# Patient Record
Sex: FEMALE | Race: WHITE | NOT HISPANIC OR LATINO | Employment: FULL TIME | URBAN - METROPOLITAN AREA
[De-identification: names, ages, dates, MRNs, and addresses within clinical notes are randomized per-mention and may not be internally consistent; named-entity substitution may affect disease eponyms.]

---

## 2023-04-25 LAB
THYROTROPIN (MIU/L) IN SER/PLAS BY DETECTION LIMIT <= 0.05 MIU/L: 5.84 MIU/L (ref 0.44–3.98)
THYROXINE (T4) FREE (NG/DL) IN SER/PLAS: 1.03 NG/DL (ref 0.61–1.12)

## 2023-04-30 DIAGNOSIS — M13.0 POLYARTHRITIS, UNSPECIFIED: ICD-10-CM

## 2023-05-01 RX ORDER — MELOXICAM 15 MG/1
TABLET ORAL
Qty: 90 TABLET | Refills: 3 | Status: SHIPPED | OUTPATIENT
Start: 2023-05-01 | End: 2024-03-25

## 2023-07-08 PROBLEM — F41.9 ANXIETY AND DEPRESSION: Status: ACTIVE | Noted: 2023-07-08

## 2023-07-08 PROBLEM — F32.A ANXIETY AND DEPRESSION: Status: ACTIVE | Noted: 2023-07-08

## 2023-07-08 PROBLEM — Z00.00 ROUTINE ADULT HEALTH MAINTENANCE: Status: ACTIVE | Noted: 2023-07-08

## 2023-07-08 PROBLEM — Z87.19 H/O GASTRITIS: Status: ACTIVE | Noted: 2023-07-08

## 2023-07-08 PROBLEM — E03.9 HYPOTHYROIDISM: Status: ACTIVE | Noted: 2023-07-08

## 2023-07-08 PROBLEM — K59.00 CONSTIPATION: Status: ACTIVE | Noted: 2023-07-08

## 2023-07-08 PROBLEM — G47.9 DIFFICULTY SLEEPING: Status: ACTIVE | Noted: 2023-07-08

## 2023-07-08 PROBLEM — G47.33 OBSTRUCTIVE SLEEP APNEA (ADULT) (PEDIATRIC): Status: ACTIVE | Noted: 2023-07-08

## 2023-07-08 PROBLEM — M08.00 JUVENILE RHEUMATOID ARTHRITIS (MULTI): Status: ACTIVE | Noted: 2023-07-08

## 2023-07-08 PROBLEM — S22.000A COMPRESSION FRACTURE OF THORACIC VERTEBRA (MULTI): Status: ACTIVE | Noted: 2023-07-08

## 2023-07-08 PROBLEM — D25.9 UTERINE LEIOMYOMA: Status: ACTIVE | Noted: 2023-07-08

## 2023-07-08 PROBLEM — Z86.0100 HISTORY OF COLON POLYPS: Status: ACTIVE | Noted: 2023-07-08

## 2023-07-08 PROBLEM — R53.83 FATIGUE: Status: ACTIVE | Noted: 2023-07-08

## 2023-07-08 PROBLEM — L65.9 ALOPECIA: Status: ACTIVE | Noted: 2023-07-08

## 2023-07-08 PROBLEM — F32.1 MAJOR DEPRESSIVE DISORDER, SINGLE EPISODE, MODERATE (MULTI): Status: ACTIVE | Noted: 2023-07-08

## 2023-07-08 PROBLEM — R93.1 ABNORMAL SCREENING CARDIAC CT: Status: ACTIVE | Noted: 2023-07-08

## 2023-07-08 PROBLEM — Z87.81 H/O FRACTURE OF WRIST: Status: ACTIVE | Noted: 2023-07-08

## 2023-07-08 PROBLEM — K44.9 HIATAL HERNIA: Status: ACTIVE | Noted: 2023-07-08

## 2023-07-08 PROBLEM — R33.9 RETENTION OF URINE: Status: ACTIVE | Noted: 2023-07-08

## 2023-07-08 PROBLEM — R31.21 ASYMPTOMATIC MICROSCOPIC HEMATURIA: Status: ACTIVE | Noted: 2023-07-08

## 2023-07-08 PROBLEM — D64.9 ANEMIA: Status: ACTIVE | Noted: 2023-07-08

## 2023-07-08 PROBLEM — J45.909 REACTIVE AIRWAY DISEASE (HHS-HCC): Status: ACTIVE | Noted: 2023-07-08

## 2023-07-08 PROBLEM — R91.1 LUNG NODULE: Status: ACTIVE | Noted: 2023-07-08

## 2023-07-08 PROBLEM — M85.9 DISORDER OF BONE DENSITY AND STRUCTURE, UNSPECIFIED: Status: ACTIVE | Noted: 2023-07-08

## 2023-07-08 PROBLEM — N83.00 CYST, OVARY, FOLLICULAR: Status: ACTIVE | Noted: 2023-07-08

## 2023-07-08 PROBLEM — K21.9 GASTROESOPHAGEAL REFLUX DISEASE: Status: ACTIVE | Noted: 2023-07-08

## 2023-07-08 PROBLEM — K76.0 STEATOSIS OF LIVER: Status: ACTIVE | Noted: 2023-07-08

## 2023-07-08 PROBLEM — Z96.41 PRESENCE OF INSULIN PUMP: Status: ACTIVE | Noted: 2023-07-08

## 2023-07-08 PROBLEM — M13.0 POLYARTHROPATHY: Status: ACTIVE | Noted: 2023-07-08

## 2023-07-08 PROBLEM — K76.89 LIVER CYST: Status: ACTIVE | Noted: 2023-07-08

## 2023-07-08 PROBLEM — J30.9 ALLERGIC RHINITIS: Status: ACTIVE | Noted: 2023-07-08

## 2023-07-08 PROBLEM — R03.0 BLOOD PRESSURE ELEVATED WITHOUT HISTORY OF HTN: Status: ACTIVE | Noted: 2023-07-08

## 2023-07-08 PROBLEM — Z86.010 HISTORY OF COLON POLYPS: Status: ACTIVE | Noted: 2023-07-08

## 2023-07-31 LAB — THYROTROPIN (MIU/L) IN SER/PLAS BY DETECTION LIMIT <= 0.05 MIU/L: 4.53 MIU/L (ref 0.44–3.98)

## 2023-08-07 ENCOUNTER — LAB (OUTPATIENT)
Dept: LAB | Facility: LAB | Age: 58
End: 2023-08-07
Payer: COMMERCIAL

## 2023-08-07 ENCOUNTER — OFFICE VISIT (OUTPATIENT)
Dept: PRIMARY CARE | Facility: CLINIC | Age: 58
End: 2023-08-07
Payer: COMMERCIAL

## 2023-08-07 VITALS
HEART RATE: 64 BPM | DIASTOLIC BLOOD PRESSURE: 72 MMHG | HEIGHT: 70 IN | SYSTOLIC BLOOD PRESSURE: 134 MMHG | WEIGHT: 206.13 LBS | TEMPERATURE: 98.2 F | OXYGEN SATURATION: 95 % | BODY MASS INDEX: 29.51 KG/M2

## 2023-08-07 DIAGNOSIS — E10.9 TYPE 1 DIABETES MELLITUS WITHOUT COMPLICATION (MULTI): ICD-10-CM

## 2023-08-07 DIAGNOSIS — E55.9 VITAMIN D DEFICIENCY: ICD-10-CM

## 2023-08-07 DIAGNOSIS — Z12.31 ENCOUNTER FOR SCREENING MAMMOGRAM FOR BREAST CANCER: ICD-10-CM

## 2023-08-07 DIAGNOSIS — G47.33 OBSTRUCTIVE SLEEP APNEA (ADULT) (PEDIATRIC): ICD-10-CM

## 2023-08-07 DIAGNOSIS — R26.89 BALANCE PROBLEM: ICD-10-CM

## 2023-08-07 DIAGNOSIS — E83.52 HYPERCALCEMIA: ICD-10-CM

## 2023-08-07 DIAGNOSIS — Z00.00 ROUTINE ADULT HEALTH MAINTENANCE: Primary | ICD-10-CM

## 2023-08-07 DIAGNOSIS — R25.1 TREMOR: ICD-10-CM

## 2023-08-07 DIAGNOSIS — F32.A ANXIETY AND DEPRESSION: ICD-10-CM

## 2023-08-07 DIAGNOSIS — F41.9 ANXIETY AND DEPRESSION: ICD-10-CM

## 2023-08-07 DIAGNOSIS — Z13.820 SCREENING FOR OSTEOPOROSIS: ICD-10-CM

## 2023-08-07 DIAGNOSIS — Z00.00 ROUTINE ADULT HEALTH MAINTENANCE: ICD-10-CM

## 2023-08-07 PROBLEM — D64.9 ANEMIA: Status: RESOLVED | Noted: 2023-07-08 | Resolved: 2023-08-07

## 2023-08-07 PROBLEM — Z87.81 H/O COMPRESSION FRACTURE OF SPINE: Status: ACTIVE | Noted: 2023-07-08

## 2023-08-07 PROBLEM — G47.9 DIFFICULTY SLEEPING: Status: RESOLVED | Noted: 2023-07-08 | Resolved: 2023-08-07

## 2023-08-07 PROBLEM — K59.00 CONSTIPATION: Status: RESOLVED | Noted: 2023-07-08 | Resolved: 2023-08-07

## 2023-08-07 PROBLEM — R33.9 RETENTION OF URINE: Status: RESOLVED | Noted: 2023-07-08 | Resolved: 2023-08-07

## 2023-08-07 PROBLEM — F32.1 MAJOR DEPRESSIVE DISORDER, SINGLE EPISODE, MODERATE (MULTI): Status: RESOLVED | Noted: 2023-07-08 | Resolved: 2023-08-07

## 2023-08-07 PROBLEM — G25.81 RLS (RESTLESS LEGS SYNDROME): Status: ACTIVE | Noted: 2023-08-07

## 2023-08-07 PROBLEM — R53.83 FATIGUE: Status: RESOLVED | Noted: 2023-07-08 | Resolved: 2023-08-07

## 2023-08-07 LAB
BASOPHILS (10*3/UL) IN BLOOD BY AUTOMATED COUNT: 0.06 X10E9/L (ref 0–0.1)
BASOPHILS/100 LEUKOCYTES IN BLOOD BY AUTOMATED COUNT: 0.7 % (ref 0–2)
EOSINOPHILS (10*3/UL) IN BLOOD BY AUTOMATED COUNT: 0.25 X10E9/L (ref 0–0.7)
EOSINOPHILS/100 LEUKOCYTES IN BLOOD BY AUTOMATED COUNT: 2.8 % (ref 0–6)
ERYTHROCYTE DISTRIBUTION WIDTH (RATIO) BY AUTOMATED COUNT: 13.9 % (ref 11.5–14.5)
ERYTHROCYTE MEAN CORPUSCULAR HEMOGLOBIN CONCENTRATION (G/DL) BY AUTOMATED: 32.2 G/DL (ref 32–36)
ERYTHROCYTE MEAN CORPUSCULAR VOLUME (FL) BY AUTOMATED COUNT: 93 FL (ref 80–100)
ERYTHROCYTES (10*6/UL) IN BLOOD BY AUTOMATED COUNT: 4.89 X10E12/L (ref 4–5.2)
HEMATOCRIT (%) IN BLOOD BY AUTOMATED COUNT: 45.6 % (ref 36–46)
HEMOGLOBIN (G/DL) IN BLOOD: 14.7 G/DL (ref 12–16)
IMMATURE GRANULOCYTES/100 LEUKOCYTES IN BLOOD BY AUTOMATED COUNT: 0.3 % (ref 0–0.9)
LEUKOCYTES (10*3/UL) IN BLOOD BY AUTOMATED COUNT: 8.8 X10E9/L (ref 4.4–11.3)
LYMPHOCYTES (10*3/UL) IN BLOOD BY AUTOMATED COUNT: 2.48 X10E9/L (ref 1.2–4.8)
LYMPHOCYTES/100 LEUKOCYTES IN BLOOD BY AUTOMATED COUNT: 28.2 % (ref 13–44)
MONOCYTES (10*3/UL) IN BLOOD BY AUTOMATED COUNT: 0.62 X10E9/L (ref 0.1–1)
MONOCYTES/100 LEUKOCYTES IN BLOOD BY AUTOMATED COUNT: 7.1 % (ref 2–10)
NEUTROPHILS (10*3/UL) IN BLOOD BY AUTOMATED COUNT: 5.34 X10E9/L (ref 1.2–7.7)
NEUTROPHILS/100 LEUKOCYTES IN BLOOD BY AUTOMATED COUNT: 60.9 % (ref 40–80)
PLATELETS (10*3/UL) IN BLOOD AUTOMATED COUNT: 327 X10E9/L (ref 150–450)

## 2023-08-07 PROCEDURE — 80053 COMPREHEN METABOLIC PANEL: CPT

## 2023-08-07 PROCEDURE — 99213 OFFICE O/P EST LOW 20 MIN: CPT | Performed by: INTERNAL MEDICINE

## 2023-08-07 PROCEDURE — 3078F DIAST BP <80 MM HG: CPT | Performed by: INTERNAL MEDICINE

## 2023-08-07 PROCEDURE — 83970 ASSAY OF PARATHORMONE: CPT

## 2023-08-07 PROCEDURE — 82043 UR ALBUMIN QUANTITATIVE: CPT

## 2023-08-07 PROCEDURE — 82390 ASSAY OF CERULOPLASMIN: CPT

## 2023-08-07 PROCEDURE — 82306 VITAMIN D 25 HYDROXY: CPT

## 2023-08-07 PROCEDURE — 83735 ASSAY OF MAGNESIUM: CPT

## 2023-08-07 PROCEDURE — 86140 C-REACTIVE PROTEIN: CPT

## 2023-08-07 PROCEDURE — 99396 PREV VISIT EST AGE 40-64: CPT | Performed by: INTERNAL MEDICINE

## 2023-08-07 PROCEDURE — 1036F TOBACCO NON-USER: CPT | Performed by: INTERNAL MEDICINE

## 2023-08-07 PROCEDURE — 82570 ASSAY OF URINE CREATININE: CPT

## 2023-08-07 PROCEDURE — 3075F SYST BP GE 130 - 139MM HG: CPT | Performed by: INTERNAL MEDICINE

## 2023-08-07 PROCEDURE — 85025 COMPLETE CBC W/AUTO DIFF WBC: CPT

## 2023-08-07 PROCEDURE — 36415 COLL VENOUS BLD VENIPUNCTURE: CPT

## 2023-08-07 PROCEDURE — 86038 ANTINUCLEAR ANTIBODIES: CPT

## 2023-08-07 PROCEDURE — 3051F HG A1C>EQUAL 7.0%<8.0%: CPT | Performed by: INTERNAL MEDICINE

## 2023-08-07 PROCEDURE — 81003 URINALYSIS AUTO W/O SCOPE: CPT

## 2023-08-07 RX ORDER — FERROUS SULFATE 325(65) MG
65 TABLET ORAL
COMMUNITY
Start: 2022-08-01

## 2023-08-07 RX ORDER — SPIRONOLACTONE 50 MG/1
50 TABLET, FILM COATED ORAL DAILY
COMMUNITY
Start: 2017-07-31 | End: 2024-01-21

## 2023-08-07 RX ORDER — CYCLOSPORINE 0.5 MG/ML
1 EMULSION OPHTHALMIC EVERY 12 HOURS
COMMUNITY
Start: 2022-11-21

## 2023-08-07 RX ORDER — GABAPENTIN 300 MG/1
600 CAPSULE ORAL NIGHTLY
COMMUNITY
Start: 2021-09-16 | End: 2023-12-04 | Stop reason: SDUPTHER

## 2023-08-07 RX ORDER — CALCIUM CARBONATE 200(500)MG
1 TABLET,CHEWABLE ORAL AS NEEDED
COMMUNITY
Start: 2021-09-16

## 2023-08-07 RX ORDER — BLOOD SUGAR DIAGNOSTIC
STRIP MISCELLANEOUS
COMMUNITY
Start: 2008-05-13

## 2023-08-07 RX ORDER — FLUOXETINE HYDROCHLORIDE 40 MG/1
1 CAPSULE ORAL DAILY
COMMUNITY
Start: 2019-12-27 | End: 2023-11-25

## 2023-08-07 RX ORDER — LORATADINE 10 MG/1
1 CAPSULE, LIQUID FILLED ORAL DAILY
COMMUNITY

## 2023-08-07 RX ORDER — CHLORPHENIRAMINE MALEATE 4 MG
2 TABLET ORAL 2 TIMES DAILY
COMMUNITY
Start: 2021-09-16

## 2023-08-07 RX ORDER — INSULIN ASPART 100 [IU]/ML
INJECTION, SOLUTION INTRAVENOUS; SUBCUTANEOUS
COMMUNITY
Start: 2019-08-22 | End: 2023-10-18 | Stop reason: SDUPTHER

## 2023-08-07 RX ORDER — ESOMEPRAZOLE MAGNESIUM 40 MG/1
1 CAPSULE, DELAYED RELEASE ORAL DAILY
COMMUNITY
Start: 2021-09-16 | End: 2023-08-17

## 2023-08-07 RX ORDER — MOXIFLOXACIN 5 MG/ML
1 SOLUTION/ DROPS OPHTHALMIC AS NEEDED
COMMUNITY
Start: 2022-06-07

## 2023-08-07 RX ORDER — CHOLECALCIFEROL (VITAMIN D3) 50 MCG
1 TABLET ORAL DAILY
COMMUNITY
Start: 2021-09-17

## 2023-08-07 RX ORDER — LEVOTHYROXINE SODIUM 137 UG/1
1 TABLET ORAL DAILY
COMMUNITY
Start: 2016-01-19 | End: 2023-11-16

## 2023-08-07 ASSESSMENT — PATIENT HEALTH QUESTIONNAIRE - PHQ9
8. MOVING OR SPEAKING SO SLOWLY THAT OTHER PEOPLE COULD HAVE NOTICED. OR THE OPPOSITE, BEING SO FIGETY OR RESTLESS THAT YOU HAVE BEEN MOVING AROUND A LOT MORE THAN USUAL: NOT AT ALL
SUM OF ALL RESPONSES TO PHQ9 QUESTIONS 1 AND 2: 6
1. LITTLE INTEREST OR PLEASURE IN DOING THINGS: NEARLY EVERY DAY
4. FEELING TIRED OR HAVING LITTLE ENERGY: NEARLY EVERY DAY
9. THOUGHTS THAT YOU WOULD BE BETTER OFF DEAD, OR OF HURTING YOURSELF: NOT AT ALL
6. FEELING BAD ABOUT YOURSELF - OR THAT YOU ARE A FAILURE OR HAVE LET YOURSELF OR YOUR FAMILY DOWN: NEARLY EVERY DAY
7. TROUBLE CONCENTRATING ON THINGS, SUCH AS READING THE NEWSPAPER OR WATCHING TELEVISION: NOT AT ALL
5. POOR APPETITE OR OVEREATING: NEARLY EVERY DAY
3. TROUBLE FALLING OR STAYING ASLEEP OR SLEEPING TOO MUCH: MORE THAN HALF THE DAYS
SUM OF ALL RESPONSES TO PHQ QUESTIONS 1-9: 17
2. FEELING DOWN, DEPRESSED OR HOPELESS: NEARLY EVERY DAY

## 2023-08-07 NOTE — ASSESSMENT & PLAN NOTE
Annual Wellness exam completed   Preventive Health history reviewed:  Vaccines today Shingrix discussed  Labs ordered    Mammogram ordered  BMD ordered  Depression Screening done

## 2023-08-07 NOTE — PROGRESS NOTES
Reason for Visit: Annual Physical Exam    Assessment and Plan:  Problem List Items Addressed This Visit          High    Routine adult health maintenance - Primary    Overview     Influenza Vaccine, Split-Non Spec11/2/2011, 9/26/2009, 8/18/21, 10/1/22  Moderna COVID 19 vaccine 2/10/21, 3/10/21, 8/18/21  Pneumovax 7/9/2007  Adacel 7/17  PAP/HPV 4/18  ([facility]); PAP 4/5/21 (-)- sees GYN  Cscope 8/17 (7yrs); 11/2021 (12y)  Mamm 7/20/18, 11/5/19; 4/9/21, 8/9/22  BMD 12/19, 12/1/21         Current Assessment & Plan     Annual Wellness exam completed   Preventive Health history reviewed:  Vaccines today Shingrix discussed  Labs ordered    Mammogram ordered  BMD ordered  Depression Screening done         Relevant Orders    Urinalysis with Reflex Microscopic    Comprehensive Metabolic Panel    CBC and Auto Differential    XR DEXA bone density       Medium    Anxiety and depression    Overview      Failed Lexapro and Wellbutrin.   Zoloft caused side effects but helped the most.  on Prozac (20mg ineffective)         Hypercalcemia    Overview     1/23: 10.4           Current Assessment & Plan     Will repeat and check PTH         Relevant Orders    PTH, intact    Heavy Metals Screen, Urine    Obstructive sleep apnea (adult) (pediatric)    Overview      2/22 PSG: Moderate PRRENA  CPAP recommend, unable to tolerate         Type 1 diabetes mellitus without complication (CMS/[facility])    Overview     Managed by ENDO at [facility]  On insulin pump  7/22 Hba1c 7.1%;         Relevant Orders    Albumin, urine, random    Vitamin D deficiency    Overview     Comment on above: Goal 40-50Off supplement;         Relevant Orders    Vitamin D, Total     Other Visit Diagnoses       Encounter for screening mammogram for breast cancer        Relevant Orders    BI mammo bilateral screening tomosynthesis    Screening for osteoporosis        Relevant Orders    XR DEXA bone density    Tremor        ? etiology   will check labs  Get Neuro involved    Relevant  "Orders    Referral to Neurology    Magnesium    MR brain wo IV contrast    Ceruloplasmin    Heavy Metals Screen, Urine    MARIE with Reflex to LENKA    C-reactive protein    Balance problem        Unclear etiology  Doubt a med she has been on long term as cause  Will image brain  Neuro consult    Relevant Orders    Referral to Neurology    Magnesium    MR brain wo IV contrast    Ceruloplasmin    MARIE with Reflex to LENKA    C-reactive protein          HPI:  Falling 4 times in 2 months   Not passing out  Losing balance  Has to hold on to treadmill or falls off  Tremor developed in your left hand  Worse past few months  NO ne meds  No Has  + nausea, new   Is on MOBIC  Is on both claritin and zyrtec daily, ineffective alone, for 4 years  On gabapentin for RLS, for a year  leans more to left than right  No seizure, no syncope, no vertigo    Recent TSH 4.53  T4 1.0  Hba1c 7%  Sees Endo     ROS otherwise negative aside from what was mentioned above in HPI.    Vitals  /72   Pulse 64   Temp 36.8 °C (98.2 °F)   Ht 1.778 m (5' 10\")   Wt 93.5 kg (206 lb 2 oz)   SpO2 95%   BMI 29.58 kg/m²   Body mass index is 29.58 kg/m².  Physical Exam  Gen: Alert, NAD  HEENT:  Normal exam, PERRLA, EMOI  Neck:  No masses/nodes palpable; Thyroid nontender and without nodules; No DAYNA  Respiratory:  Lungs CTAB  CV: RRR  Neuro:  Gross motor and sensory intact to all 4 modalities though temperature sensation is mildly decreased in LE compared to UE. She does exhibit a tremor in her left index finger while raising it  No asterixes, no retsing tremor  Skin:  No suspicious lesions present  Breast: No masses or axillary lymphadenopathy  Seeing GYN soon    Active Problem List  Patient Active Problem List   Diagnosis    Abnormal screening cardiac CT    Routine adult health maintenance    H/O gastritis    History of colon polyps    H/O fracture of wrist    BMI 29.0-29.9,adult    Allergic rhinitis    Alopecia    Anxiety and depression    Asymptomatic " microscopic hematuria    Blood pressure elevated without history of HTN    Gastroesophageal reflux disease    Hiatal hernia    H/O compression fracture of spine    Cyst, ovary, follicular    Disorder of bone density and structure, unspecified    Steatosis of liver    Liver cyst    Hypothyroidism    Juvenile rheumatoid arthritis (CMS/HCC)    Lung nodule    Mixed hyperlipidemia    Obstructive sleep apnea (adult) (pediatric)    Polyarthropathy    Presence of insulin pump    Reactive airway disease    Type 1 diabetes mellitus without complication (CMS/HCC)    Uterine leiomyoma    Vitamin D deficiency    RLS (restless legs syndrome)    Hypercalcemia       Comprehensive Medical/Surgical/Social/Family History  Past Medical History:   Diagnosis Date    Abnormal screening cardiac CT     8/22:1. Coronary artery calcium score of 13* (LAD) 2. PARK 83rd percentile** for age, gender, and race in asymptomatic patients. *Coronary Artery  Agatston Score risk:Very low 1-99 Small hiatal hernia. 1.2 cm hepatic cysts and several too small to characterize liver hypodensities. A 1.7 cm round hypodensity at the dome of the liver laterally does not meet the criteria of a simple cyst.    H/O abdominal ultrasound     8/22: US abd: Cyst in the superior right hepatic lobe measures 1.8 cm. There is a possible background of hepatic steatosis. No other focal hepatic abnormality was demonstrated    H/O bone density study 12/06/2021 12/21:Lowest T score -1.4. FRAX* 10-year Probability of Fracture Based on femoral neck BMD: DualFemur (Left) Major Osteoporotic Fracture: 11.8% Hip Fracture:                1.1% 12/19: -1.7:  FRACTURE RISK (based on FRAX):                       10-year absolute fracture risk:                            - major osteoporotic fracture = 5.8 %                            - hip fracture = 0.5 %    H/O chest x-ray     3/17:  normal    H/O CT scan of chest     1/20: IMPRESSION: 1. 6 mm ground-glass nodule within the apical  segment of the left lower lobe, stable from , most likely benign given its stability. No additional pulmonary nodules or masses identified. 2. Mild scattered areas of atelectasis in both lungs as above. Lungs are otherwise clear of focal infiltrate. 3. Trace pericardial effusion. 4. Age indeterminate mild compression deformityT8.    H/O echocardiogram     : normal    H/O pelvic ultrasound     : IMPRESSION: Endometrium measures up to 11 mm in thickness, as described above. This is abnormal in the postmenopausal patient with uterine bleeding.  Leiomyomatous uterus, as discussed above.  Simple left ovarian cyst measures up to 2.7 cm; surveillance pelvic ultrasound is recommended in 1 year for this finding.     Past Surgical History:   Procedure Laterality Date    BELT ABDOMINOPLASTY  07/10/2018    abdominplasty and breast augmentation.     SECTION, CLASSIC  07/10/2018     Section    COLONOSCOPY  2021: polyp (5 years) 2021: - The examined portion of the ileum was normal.                        - Non-bleeding internal hemorrhoids. 10 y repeat    ESOPHAGOGASTRODUODENOSCOPY  2022:  - Medium-sized hiatal hernia. GE junction is at 36cm                        - Gastritis. Biopsied.                        - Normal examined duodenum. : Medium-sized hiatal hernia. GE junction is at 36cm                        - Gastritis. Biopsied.                        - Normal examined duodenum.    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               F: Prostate CA    B: Type 2 DM    B: Depression    PGM: Lung Cancer                                MGF:Skin  Cancer                           Allergies and Medications  Desvenlafaxine, Pollen extracts, Sertraline, and Spironolactone  Current Outpatient Medications   Medication Instructions    calcium carbonate (Tums) 200 mg calcium chewable tablet 1 tablet, oral, As needed    cetirizine (ZyrTEC) 10 mg capsule 1 capsule, oral, Daily    chlorpheniramine (Chlor-Trimeton) 4 mg tablet 2 tablets, oral, 2 times daily    cholecalciferol (Vitamin D-3) 50 MCG (2000 UT) tablet 1 tablet, oral, Daily    esomeprazole (NexIUM) 40 mg DR capsule 1 capsule, oral, Daily    ferrous sulfate 325 (65 Fe) MG tablet 65 mg of iron, oral, Daily with breakfast    FLUoxetine (PROzac) 40 mg capsule 1 capsule, oral, Daily    gabapentin (NEURONTIN) 600 mg, oral, Nightly    glucagon 1 mg injection Glucagon Emergency 1 MG Injection Kit  Refills: 0    L. acidophilus/Bifid. animalis 32 billion cell capsule 1 capsule, oral, Daily    levothyroxine (Synthroid, Levoxyl) 137 mcg tablet 1 tablet, oral, Daily, Take 1 tablet daily for 6 days then 1 1/2 tablets on Sunday.    loratadine (Claritin Liqui-Gel) 10 mg capsule 1 capsule, oral, Daily    meloxicam (Mobic) 15 mg tablet TAKE 1 TABLET EVERY DAY AS NEEDED    moxifloxacin (Vigamox) 0.5 % ophthalmic solution 1 drop, Both Eyes, As needed    NovoLOG U-100 Insulin aspart 100 unit/mL injection 70 units daily via insulin pump    OneTouch Ultra Test strip check 4 times a day. 250.01. Insulin pump.    Restasis 0.05 % ophthalmic emulsion 1 drop, Both Eyes, Every 12 hours    spironolactone (ALDACTONE) 50 mg, oral, Daily

## 2023-08-08 LAB
ALANINE AMINOTRANSFERASE (SGPT) (U/L) IN SER/PLAS: 23 U/L (ref 7–45)
ALBUMIN (G/DL) IN SER/PLAS: 4.5 G/DL (ref 3.4–5)
ALBUMIN (MG/L) IN URINE: <7 MG/L
ALBUMIN/CREATININE (UG/MG) IN URINE: NORMAL UG/MG CRT (ref 0–30)
ALKALINE PHOSPHATASE (U/L) IN SER/PLAS: 93 U/L (ref 33–110)
ANION GAP IN SER/PLAS: 12 MMOL/L (ref 10–20)
ANTI-NUCLEAR ANTIBODY (ANA): NEGATIVE
APPEARANCE, URINE: CLEAR
ASPARTATE AMINOTRANSFERASE (SGOT) (U/L) IN SER/PLAS: 27 U/L (ref 9–39)
BILIRUBIN TOTAL (MG/DL) IN SER/PLAS: 0.6 MG/DL (ref 0–1.2)
BILIRUBIN, URINE: NEGATIVE
BLOOD, URINE: NEGATIVE
C REACTIVE PROTEIN (MG/L) IN SER/PLAS: 0.57 MG/DL
CALCIDIOL (25 OH VITAMIN D3) (NG/ML) IN SER/PLAS: 47 NG/ML
CALCIUM (MG/DL) IN SER/PLAS: 10.1 MG/DL (ref 8.6–10.3)
CARBON DIOXIDE, TOTAL (MMOL/L) IN SER/PLAS: 29 MMOL/L (ref 21–32)
CERULOPLASMIN (MG/DL) IN SER/PLAS: 36 MG/DL (ref 20–60)
CHLORIDE (MMOL/L) IN SER/PLAS: 100 MMOL/L (ref 98–107)
COLOR, URINE: YELLOW
CREATININE (MG/DL) IN SER/PLAS: 0.89 MG/DL (ref 0.5–1.05)
CREATININE (MG/DL) IN URINE: 88.5 MG/DL (ref 20–320)
GFR FEMALE: 76 ML/MIN/1.73M2
GLUCOSE (MG/DL) IN SER/PLAS: 166 MG/DL (ref 74–99)
GLUCOSE, URINE: NEGATIVE MG/DL
KETONES, URINE: NEGATIVE MG/DL
LEUKOCYTE ESTERASE, URINE: NEGATIVE
MAGNESIUM (MG/DL) IN SER/PLAS: 1.98 MG/DL (ref 1.6–2.4)
NITRITE, URINE: NEGATIVE
PARATHYRIN INTACT (PG/ML) IN SER/PLAS: 32.7 PG/ML (ref 18.5–88)
PH, URINE: 6 (ref 5–8)
POTASSIUM (MMOL/L) IN SER/PLAS: 4.4 MMOL/L (ref 3.5–5.3)
PROTEIN TOTAL: 7.3 G/DL (ref 6.4–8.2)
PROTEIN, URINE: NEGATIVE MG/DL
SODIUM (MMOL/L) IN SER/PLAS: 137 MMOL/L (ref 136–145)
SPECIFIC GRAVITY, URINE: 1.02 (ref 1–1.03)
UREA NITROGEN (MG/DL) IN SER/PLAS: 15 MG/DL (ref 6–23)
UROBILINOGEN, URINE: <2 MG/DL (ref 0–1.9)

## 2023-08-09 ENCOUNTER — LAB (OUTPATIENT)
Dept: LAB | Facility: LAB | Age: 58
End: 2023-08-09
Payer: COMMERCIAL

## 2023-08-09 DIAGNOSIS — R25.1 TREMOR: ICD-10-CM

## 2023-08-09 DIAGNOSIS — E83.52 HYPERCALCEMIA: ICD-10-CM

## 2023-08-09 PROCEDURE — 83825 ASSAY OF MERCURY: CPT

## 2023-08-14 LAB
ARSENIC URINE - PER 24H: NORMAL UG/D (ref 0–49.9)
ARSENIC URINE - PER VOLUME: <10 UG/L (ref 0–34.9)
ARSENIC, URINE - RATIO TO CRT: NORMAL UG/G CRT (ref 0–29.9)
CREATININE 24 HOUR URINE: NORMAL MG/D (ref 500–1400)
CREATININE, URINE - PER VOLUME: 123 MG/DL
HOURS COLLECTED (ARUP): NORMAL
LEAD 24 HOUR URINE: NORMAL UG/D (ref 0–8.1)
LEAD, URINE PER VOLUME: <5 UG/L (ref 0–5)
LEAD/CREATININE RATIO U: NORMAL UG/G CRT (ref 0–5)
MERCURY, URINE 24 HR: NORMAL UG/D (ref 0–20)
MERCURY, URINE PER VOLUME: <2.5 UG/L (ref 0–5)
MERCURY/CREATININE RATIO: NORMAL UG/G CRT (ref 0–20)

## 2023-08-17 DIAGNOSIS — K21.9 GASTRO-ESOPHAGEAL REFLUX DISEASE WITHOUT ESOPHAGITIS: ICD-10-CM

## 2023-08-17 RX ORDER — ESOMEPRAZOLE MAGNESIUM 40 MG/1
40 CAPSULE, DELAYED RELEASE ORAL DAILY
Qty: 90 CAPSULE | Refills: 3 | Status: SHIPPED | OUTPATIENT
Start: 2023-08-17

## 2023-09-28 ENCOUNTER — APPOINTMENT (OUTPATIENT)
Dept: PRIMARY CARE | Facility: CLINIC | Age: 58
End: 2023-09-28
Payer: COMMERCIAL

## 2023-10-04 PROBLEM — M54.50 LOW BACK PAIN: Status: ACTIVE | Noted: 2023-10-04

## 2023-10-04 PROBLEM — E03.9 HYPOTHYROIDISM, ADULT: Status: ACTIVE | Noted: 2023-10-04

## 2023-10-04 PROBLEM — R25.1 TREMOR: Status: ACTIVE | Noted: 2023-10-04

## 2023-10-04 PROBLEM — R06.83 SNORING: Status: ACTIVE | Noted: 2023-10-04

## 2023-10-04 PROBLEM — M19.90 ARTHRITIS: Status: ACTIVE | Noted: 2023-07-08

## 2023-10-04 PROBLEM — R05.3 CHRONIC COUGH: Status: ACTIVE | Noted: 2023-10-04

## 2023-10-04 PROBLEM — Z86.16 HISTORY OF SEVERE ACUTE RESPIRATORY SYNDROME CORONAVIRUS 2 (SARS-COV-2) DISEASE: Status: ACTIVE | Noted: 2023-10-04

## 2023-10-04 PROBLEM — S90.30XA CONTUSION OF FOOT: Status: ACTIVE | Noted: 2023-10-04

## 2023-10-04 PROBLEM — R26.89 IMPAIRMENT OF BALANCE: Status: ACTIVE | Noted: 2023-10-04

## 2023-10-04 PROBLEM — M89.9 DISORDER OF BONE: Status: ACTIVE | Noted: 2023-07-08

## 2023-10-04 PROBLEM — U09.9 COVID-19 LONG HAULER: Status: ACTIVE | Noted: 2023-10-04

## 2023-10-04 PROBLEM — K76.89 HEPATIC CYST: Status: ACTIVE | Noted: 2023-10-04

## 2023-10-04 PROBLEM — M72.2 PLANTAR FASCIITIS: Status: ACTIVE | Noted: 2023-10-04

## 2023-10-04 PROBLEM — R53.83 FATIGUE: Status: ACTIVE | Noted: 2023-10-04

## 2023-10-04 PROBLEM — M25.561 CHRONIC PAIN OF BOTH KNEES: Status: ACTIVE | Noted: 2023-10-04

## 2023-10-04 PROBLEM — R40.0 DAYTIME SOMNOLENCE: Status: ACTIVE | Noted: 2023-10-04

## 2023-10-04 PROBLEM — Z97.8 USES SELF-APPLIED CONTINUOUS GLUCOSE MONITORING DEVICE: Status: ACTIVE | Noted: 2023-10-04

## 2023-10-04 PROBLEM — M25.562 CHRONIC PAIN OF BOTH KNEES: Status: ACTIVE | Noted: 2023-10-04

## 2023-10-04 PROBLEM — N95.0 POSTMENOPAUSAL BLEEDING: Status: ACTIVE | Noted: 2023-10-04

## 2023-10-04 PROBLEM — N94.9 FEMALE GENITAL SYMPTOMS: Status: ACTIVE | Noted: 2023-10-04

## 2023-10-04 PROBLEM — M77.40 METATARSALGIA: Status: ACTIVE | Noted: 2023-10-04

## 2023-10-04 PROBLEM — K76.0 FATTY LIVER: Status: ACTIVE | Noted: 2023-10-04

## 2023-10-04 PROBLEM — M79.673 PAIN OF FOOT: Status: ACTIVE | Noted: 2023-10-04

## 2023-10-04 PROBLEM — R09.02 HYPOXIA: Status: ACTIVE | Noted: 2023-10-04

## 2023-10-04 PROBLEM — S22.000A COMPRESSION FRACTURE OF THORACIC VERTEBRA (MULTI): Status: ACTIVE | Noted: 2023-10-04

## 2023-10-04 PROBLEM — M13.0 POLYARTHRITIS: Status: ACTIVE | Noted: 2023-10-04

## 2023-10-04 PROBLEM — K21.9 CHRONIC GERD: Status: ACTIVE | Noted: 2023-10-04

## 2023-10-04 PROBLEM — G25.81 RESTLESS LEG SYNDROME: Status: ACTIVE | Noted: 2023-08-07

## 2023-10-04 PROBLEM — L64.9 ANDROGENIC ALOPECIA: Status: ACTIVE | Noted: 2023-10-04

## 2023-10-04 PROBLEM — E66.3 OVERWEIGHT: Status: ACTIVE | Noted: 2023-07-08

## 2023-10-04 PROBLEM — D75.1 ERYTHROCYTOSIS: Status: ACTIVE | Noted: 2023-10-04

## 2023-10-04 PROBLEM — S99.929A INJURY OF GREAT TOE: Status: ACTIVE | Noted: 2023-10-04

## 2023-10-04 PROBLEM — R69 DISEASE SUSPECTED: Status: ACTIVE | Noted: 2023-10-04

## 2023-10-04 PROBLEM — S49.90XA INJURY OF UPPER EXTREMITY: Status: ACTIVE | Noted: 2023-10-04

## 2023-10-04 PROBLEM — S62.109A FRACTURE OF WRIST: Status: ACTIVE | Noted: 2023-10-04

## 2023-10-04 PROBLEM — R92.1 CALCIFICATION OF BREAST: Status: ACTIVE | Noted: 2023-10-04

## 2023-10-04 PROBLEM — E10.65 UNCONTROLLED TYPE 1 DIABETES MELLITUS WITH HYPERGLYCEMIA (MULTI): Status: ACTIVE | Noted: 2023-10-04

## 2023-10-04 PROBLEM — R92.8 ABNORMAL MAMMOGRAM: Status: ACTIVE | Noted: 2023-10-04

## 2023-10-04 PROBLEM — K76.9 LIVER DISEASE: Status: ACTIVE | Noted: 2022-08-15

## 2023-10-04 PROBLEM — G89.29 CHRONIC PAIN OF BOTH KNEES: Status: ACTIVE | Noted: 2023-10-04

## 2023-10-04 PROBLEM — Z96.41 INSULIN PUMP STATUS: Status: ACTIVE | Noted: 2023-10-04

## 2023-10-04 PROBLEM — D25.9 UTERINE FIBROID: Status: ACTIVE | Noted: 2023-10-04

## 2023-10-04 RX ORDER — ATORVASTATIN CALCIUM 20 MG/1
TABLET, FILM COATED ORAL
COMMUNITY
Start: 2019-12-27 | End: 2024-02-22 | Stop reason: ALTCHOICE

## 2023-10-04 RX ORDER — ROSUVASTATIN CALCIUM 10 MG/1
TABLET, COATED ORAL
COMMUNITY
Start: 2020-02-24 | End: 2024-03-07 | Stop reason: SINTOL

## 2023-10-04 RX ORDER — FLUOXETINE HYDROCHLORIDE 20 MG/1
CAPSULE ORAL
COMMUNITY
Start: 2019-12-27 | End: 2024-03-07 | Stop reason: WASHOUT

## 2023-10-04 RX ORDER — BUPROPION HYDROCHLORIDE 150 MG/1
TABLET ORAL
COMMUNITY
Start: 2019-08-22 | End: 2024-02-22 | Stop reason: ALTCHOICE

## 2023-10-04 RX ORDER — CELECOXIB 200 MG/1
CAPSULE ORAL
COMMUNITY
Start: 2020-02-24 | End: 2023-12-04 | Stop reason: WASHOUT

## 2023-10-04 RX ORDER — INSULIN GLARGINE 300 U/ML
INJECTION, SOLUTION SUBCUTANEOUS
COMMUNITY
Start: 2017-07-31

## 2023-10-04 RX ORDER — FLUTICASONE PROPIONATE 50 MCG
SPRAY, SUSPENSION (ML) NASAL
COMMUNITY

## 2023-10-04 RX ORDER — CIPROFLOXACIN HYDROCHLORIDE 3 MG/ML
SOLUTION/ DROPS OPHTHALMIC
COMMUNITY
Start: 2021-09-07

## 2023-10-04 RX ORDER — IBUPROFEN 800 MG/1
TABLET ORAL
COMMUNITY
Start: 2021-09-16 | End: 2023-12-04 | Stop reason: WASHOUT

## 2023-10-04 RX ORDER — ALBUTEROL SULFATE 90 UG/1
AEROSOL, METERED RESPIRATORY (INHALATION)
COMMUNITY
Start: 2017-03-14

## 2023-10-18 DIAGNOSIS — E10.9 TYPE 1 DIABETES MELLITUS WITHOUT COMPLICATION (MULTI): ICD-10-CM

## 2023-10-18 RX ORDER — INSULIN ASPART 100 [IU]/ML
INJECTION, SOLUTION INTRAVENOUS; SUBCUTANEOUS
Qty: 60 ML | Refills: 0 | Status: SHIPPED | OUTPATIENT
Start: 2023-10-18 | End: 2024-03-25

## 2023-11-16 DIAGNOSIS — E03.9 HYPOTHYROIDISM, UNSPECIFIED: ICD-10-CM

## 2023-11-16 RX ORDER — LEVOTHYROXINE SODIUM 137 UG/1
137 TABLET ORAL DAILY
Qty: 90 TABLET | Refills: 3 | Status: SHIPPED | OUTPATIENT
Start: 2023-11-16

## 2023-11-25 DIAGNOSIS — F41.9 ANXIETY DISORDER, UNSPECIFIED: ICD-10-CM

## 2023-11-25 DIAGNOSIS — F32.A DEPRESSION, UNSPECIFIED: ICD-10-CM

## 2023-11-25 RX ORDER — FLUOXETINE HYDROCHLORIDE 40 MG/1
40 CAPSULE ORAL DAILY
Qty: 90 CAPSULE | Refills: 3 | Status: SHIPPED | OUTPATIENT
Start: 2023-11-25

## 2023-12-04 ENCOUNTER — ANCILLARY PROCEDURE (OUTPATIENT)
Dept: RADIOLOGY | Facility: CLINIC | Age: 58
End: 2023-12-04
Payer: COMMERCIAL

## 2023-12-04 ENCOUNTER — OFFICE VISIT (OUTPATIENT)
Dept: PRIMARY CARE | Facility: CLINIC | Age: 58
End: 2023-12-04
Payer: COMMERCIAL

## 2023-12-04 VITALS
DIASTOLIC BLOOD PRESSURE: 86 MMHG | SYSTOLIC BLOOD PRESSURE: 123 MMHG | OXYGEN SATURATION: 91 % | BODY MASS INDEX: 29.16 KG/M2 | TEMPERATURE: 96.9 F | HEART RATE: 61 BPM | WEIGHT: 203.2 LBS

## 2023-12-04 DIAGNOSIS — M54.50 CHRONIC BILATERAL LOW BACK PAIN WITHOUT SCIATICA: ICD-10-CM

## 2023-12-04 DIAGNOSIS — R93.7 ABNORMAL X-RAY OF LUMBAR SPINE: Primary | ICD-10-CM

## 2023-12-04 DIAGNOSIS — G89.29 CHRONIC BILATERAL LOW BACK PAIN WITHOUT SCIATICA: ICD-10-CM

## 2023-12-04 DIAGNOSIS — G89.29 CHRONIC BILATERAL LOW BACK PAIN WITHOUT SCIATICA: Primary | ICD-10-CM

## 2023-12-04 DIAGNOSIS — R19.00 PELVIC MASS: ICD-10-CM

## 2023-12-04 DIAGNOSIS — M54.50 CHRONIC BILATERAL LOW BACK PAIN WITHOUT SCIATICA: Primary | ICD-10-CM

## 2023-12-04 DIAGNOSIS — R30.0 DYSURIA: ICD-10-CM

## 2023-12-04 LAB
APPEARANCE UR: CLEAR
BILIRUB UR STRIP.AUTO-MCNC: NEGATIVE MG/DL
COLOR UR: YELLOW
GLUCOSE UR STRIP.AUTO-MCNC: NEGATIVE MG/DL
HOLD SPECIMEN: NORMAL
KETONES UR STRIP.AUTO-MCNC: NEGATIVE MG/DL
LEUKOCYTE ESTERASE UR QL STRIP.AUTO: NEGATIVE
NITRITE UR QL STRIP.AUTO: NEGATIVE
PH UR STRIP.AUTO: 7 [PH]
PROT UR STRIP.AUTO-MCNC: NEGATIVE MG/DL
RBC # UR STRIP.AUTO: NEGATIVE /UL
SP GR UR STRIP.AUTO: 1.02
UROBILINOGEN UR STRIP.AUTO-MCNC: <2 MG/DL

## 2023-12-04 PROCEDURE — 72110 X-RAY EXAM L-2 SPINE 4/>VWS: CPT

## 2023-12-04 PROCEDURE — 1036F TOBACCO NON-USER: CPT | Performed by: NURSE PRACTITIONER

## 2023-12-04 PROCEDURE — 99214 OFFICE O/P EST MOD 30 MIN: CPT | Performed by: NURSE PRACTITIONER

## 2023-12-04 PROCEDURE — 96372 THER/PROPH/DIAG INJ SC/IM: CPT | Performed by: NURSE PRACTITIONER

## 2023-12-04 PROCEDURE — 3079F DIAST BP 80-89 MM HG: CPT | Performed by: NURSE PRACTITIONER

## 2023-12-04 PROCEDURE — 3074F SYST BP LT 130 MM HG: CPT | Performed by: NURSE PRACTITIONER

## 2023-12-04 PROCEDURE — 3051F HG A1C>EQUAL 7.0%<8.0%: CPT | Performed by: NURSE PRACTITIONER

## 2023-12-04 PROCEDURE — 72110 X-RAY EXAM L-2 SPINE 4/>VWS: CPT | Performed by: RADIOLOGY

## 2023-12-04 PROCEDURE — 81003 URINALYSIS AUTO W/O SCOPE: CPT

## 2023-12-04 RX ORDER — KETOROLAC TROMETHAMINE 30 MG/ML
60 INJECTION, SOLUTION INTRAMUSCULAR; INTRAVENOUS ONCE
Status: COMPLETED | OUTPATIENT
Start: 2023-12-04 | End: 2023-12-04

## 2023-12-04 RX ORDER — METHYLPREDNISOLONE 4 MG/1
TABLET ORAL
Qty: 21 TABLET | Refills: 0 | Status: SHIPPED | OUTPATIENT
Start: 2023-12-04 | End: 2023-12-11

## 2023-12-04 RX ORDER — GABAPENTIN 300 MG/1
CAPSULE ORAL
Qty: 360 CAPSULE | Refills: 3 | Status: SHIPPED | OUTPATIENT
Start: 2023-12-04

## 2023-12-04 RX ADMIN — KETOROLAC TROMETHAMINE 60 MG: 30 INJECTION, SOLUTION INTRAMUSCULAR; INTRAVENOUS at 08:53

## 2023-12-04 ASSESSMENT — PATIENT HEALTH QUESTIONNAIRE - PHQ9
5. POOR APPETITE OR OVEREATING: MORE THAN HALF THE DAYS
SUM OF ALL RESPONSES TO PHQ QUESTIONS 1-9: 25
1. LITTLE INTEREST OR PLEASURE IN DOING THINGS: NEARLY EVERY DAY
SUM OF ALL RESPONSES TO PHQ9 QUESTIONS 1 AND 2: 6
8. MOVING OR SPEAKING SO SLOWLY THAT OTHER PEOPLE COULD HAVE NOTICED. OR THE OPPOSITE, BEING SO FIGETY OR RESTLESS THAT YOU HAVE BEEN MOVING AROUND A LOT MORE THAN USUAL: MORE THAN HALF THE DAYS
6. FEELING BAD ABOUT YOURSELF - OR THAT YOU ARE A FAILURE OR HAVE LET YOURSELF OR YOUR FAMILY DOWN: NEARLY EVERY DAY
10. IF YOU CHECKED OFF ANY PROBLEMS, HOW DIFFICULT HAVE THESE PROBLEMS MADE IT FOR YOU TO DO YOUR WORK, TAKE CARE OF THINGS AT HOME, OR GET ALONG WITH OTHER PEOPLE: EXTREMELY DIFFICULT
7. TROUBLE CONCENTRATING ON THINGS, SUCH AS READING THE NEWSPAPER OR WATCHING TELEVISION: NEARLY EVERY DAY
4. FEELING TIRED OR HAVING LITTLE ENERGY: NEARLY EVERY DAY
9. THOUGHTS THAT YOU WOULD BE BETTER OFF DEAD, OR OF HURTING YOURSELF: NEARLY EVERY DAY
3. TROUBLE FALLING OR STAYING ASLEEP OR SLEEPING TOO MUCH: NEARLY EVERY DAY
2. FEELING DOWN, DEPRESSED OR HOPELESS: NEARLY EVERY DAY

## 2023-12-04 NOTE — PROGRESS NOTES
Problem List Items Addressed This Visit       Low back pain - Primary    Overview     Chiro (Ann Hernandez)  Flexeril  Lido patch  Mobic  gabapentin         Current Assessment & Plan     Toradol 60 mg IM today  - did get relief last time she had this at urgent care  Medrol dose pack to hold on to prn  Increase aimee from 600 mg HS to 300 mg morning, 300 mg afternoon, 600 mg HS  XR  PT  Med spine           Relevant Medications    gabapentin (Neurontin) 300 mg capsule    methylPREDNISolone (Medrol Dospak) 4 mg tablets    ketorolac (Toradol) injection 60 mg (Completed)    Other Relevant Orders    Urinalysis with Reflex Microscopic and Culture    XR lumbar spine complete 4+ views    Referral to Physical Therapy    Referral to Medical Spine     Other Visit Diagnoses       Dysuria        r/o UTI    Relevant Orders    Urinalysis with Reflex Microscopic and Culture             Subjective   Patient ID: Lina Sharma is a 58 y.o. female who presents for Back Pain (Back pain not responding to treatment/[facility] 2 months ago got a shot helped but came back /Seeing chiro/Pain on both sides shooting pains and pain in lower abdomen and umbillicus/Has a hard time sitting on the toilet in the morning and has hard time getting off the toilet/Pain with urination no frequency or urgency not cloudy or any odor).    HPI  Lumbar pain  Constant  Cramping  Worst 10/10  Now 8/10  Sciatica R resolved about 2 months ago  No loss B/B    Urgent care   - she is unsure what this shot was   - this relieved pain x 2 weeks    Chiro  Dr Ann Hernandez  Has been 3 times  No relief    Unable to exercise regularly    Relieved by: starts to relieve as she walks  Exacerbated by: laying down, sitting     Flexerill  Lidocaine patch  Mobic every day   Gabapentin   All without relief    Dysuria  Newer with this pain within past week  No hematuria  No fever  No vag discharge    Review of Systems   All other systems reviewed and are negative.      BP Readings from  "Last 3 Encounters:   12/04/23 123/86   08/07/23 134/72   04/25/23 132/84      Wt Readings from Last 3 Encounters:   12/04/23 92.2 kg (203 lb 3.2 oz)   08/07/23 93.5 kg (206 lb 2 oz)   07/31/23 93 kg (205 lb)      BMI:   Estimated body mass index is 29.16 kg/m² as calculated from the following:    Height as of 8/7/23: 1.778 m (5' 10\").    Weight as of this encounter: 92.2 kg (203 lb 3.2 oz).    Objective   Physical Exam  Constitutional:       General: She is not in acute distress.  HENT:      Head: Normocephalic and atraumatic.      Nose: Nose normal.   Eyes:      Extraocular Movements: Extraocular movements intact.      Conjunctiva/sclera: Conjunctivae normal.   Cardiovascular:      Rate and Rhythm: Normal rate and regular rhythm.      Pulses: Normal pulses.   Pulmonary:      Effort: Pulmonary effort is normal.      Breath sounds: Normal breath sounds.   Abdominal:      Palpations: Abdomen is soft.   Musculoskeletal:      Cervical back: Normal range of motion and neck supple.      Comments: L/S spine is tender, including bilat paraspinal muscles. LROM d/t discomfort/pain. Gait is guarded and painful    Skin:     General: Skin is warm and dry.   Neurological:      General: No focal deficit present.      Mental Status: She is alert.   Psychiatric:         Mood and Affect: Mood normal.       "

## 2023-12-04 NOTE — ASSESSMENT & PLAN NOTE
Toradol 60 mg IM today  - did get relief last time she had this at urgent care  Medrol dose pack to hold on to prn  Increase aimee from 600 mg HS to 300 mg morning, 300 mg afternoon, 600 mg HS  XR  PT  Med spine

## 2023-12-06 PROBLEM — R93.7 ABNORMAL X-RAY OF LUMBAR SPINE: Status: ACTIVE | Noted: 2023-12-06

## 2023-12-06 PROBLEM — M47.816 LUMBAR SPONDYLOSIS: Status: ACTIVE | Noted: 2023-12-06

## 2023-12-06 PROBLEM — M51.9 LUMBAR DISC DISEASE: Status: ACTIVE | Noted: 2023-12-06

## 2023-12-06 PROBLEM — R19.00 PELVIC MASS: Status: ACTIVE | Noted: 2023-12-06

## 2023-12-07 ENCOUNTER — APPOINTMENT (OUTPATIENT)
Dept: PHYSICAL THERAPY | Facility: CLINIC | Age: 58
End: 2023-12-07
Payer: COMMERCIAL

## 2023-12-27 ENCOUNTER — APPOINTMENT (OUTPATIENT)
Dept: RADIOLOGY | Facility: CLINIC | Age: 58
End: 2023-12-27
Payer: COMMERCIAL

## 2023-12-27 ENCOUNTER — OFFICE VISIT (OUTPATIENT)
Dept: ORTHOPEDIC SURGERY | Facility: CLINIC | Age: 58
End: 2023-12-27
Payer: COMMERCIAL

## 2023-12-27 ENCOUNTER — ANCILLARY PROCEDURE (OUTPATIENT)
Dept: RADIOLOGY | Facility: CLINIC | Age: 58
End: 2023-12-27
Payer: COMMERCIAL

## 2023-12-27 DIAGNOSIS — M54.50 CHRONIC LOW BACK PAIN WITHOUT SCIATICA, UNSPECIFIED BACK PAIN LATERALITY: ICD-10-CM

## 2023-12-27 DIAGNOSIS — E83.52 HYPERCALCEMIA: ICD-10-CM

## 2023-12-27 DIAGNOSIS — G89.29 CHRONIC LOW BACK PAIN WITHOUT SCIATICA, UNSPECIFIED BACK PAIN LATERALITY: ICD-10-CM

## 2023-12-27 DIAGNOSIS — M79.18 ACUTE MYOFASCIAL PAIN: Primary | ICD-10-CM

## 2023-12-27 DIAGNOSIS — R19.00 PELVIC MASS: ICD-10-CM

## 2023-12-27 DIAGNOSIS — R93.7 ABNORMAL X-RAY OF LUMBAR SPINE: ICD-10-CM

## 2023-12-27 DIAGNOSIS — M89.9 BONE LESION: Primary | ICD-10-CM

## 2023-12-27 DIAGNOSIS — R93.5 ABNORMAL CT OF THE ABDOMEN: ICD-10-CM

## 2023-12-27 PROCEDURE — 3051F HG A1C>EQUAL 7.0%<8.0%: CPT | Performed by: STUDENT IN AN ORGANIZED HEALTH CARE EDUCATION/TRAINING PROGRAM

## 2023-12-27 PROCEDURE — 74177 CT ABD & PELVIS W/CONTRAST: CPT | Performed by: RADIOLOGY

## 2023-12-27 PROCEDURE — 1036F TOBACCO NON-USER: CPT | Performed by: STUDENT IN AN ORGANIZED HEALTH CARE EDUCATION/TRAINING PROGRAM

## 2023-12-27 PROCEDURE — 74177 CT ABD & PELVIS W/CONTRAST: CPT

## 2023-12-27 PROCEDURE — 99203 OFFICE O/P NEW LOW 30 MIN: CPT | Performed by: STUDENT IN AN ORGANIZED HEALTH CARE EDUCATION/TRAINING PROGRAM

## 2023-12-27 PROCEDURE — 2550000001 HC RX 255 CONTRASTS: Performed by: NURSE PRACTITIONER

## 2023-12-27 PROCEDURE — 99213 OFFICE O/P EST LOW 20 MIN: CPT | Performed by: STUDENT IN AN ORGANIZED HEALTH CARE EDUCATION/TRAINING PROGRAM

## 2023-12-27 RX ADMIN — IOHEXOL 90 ML: 350 INJECTION, SOLUTION INTRAVENOUS at 11:15

## 2023-12-27 SDOH — SOCIAL STABILITY: SOCIAL NETWORK: SOCIAL ACTIVITY:: 10

## 2023-12-27 NOTE — PROGRESS NOTES
Assessment     Lina is a 58 y.o. female with significant past medical history of DM1, hypothyroid, anxiety, GERD, who presents with acute low back pain .  The patient's symptoms, clinical exam and imaging studies are suggestive of myofacial pain.  The other possible differential diagnosis(es) include(s):  disc herniation and radiculopathy (L1-S1).      Plan     At this time, I would like to make the following recommendation/plan:  1.  Physical Therapy: starting tomorrow.   2.  Medication: continue with meloxicam 15mg every day, continue with Gabapentin 600mg TID   3.  Injections: unclear injection target at this time   4.  Imaging: Interpreted Xray of lumbar spine from 12/04/23 as L5 on S1 retrolisthesis with disc space narrowing. Multilevel facet arthropathy (L4-S1). If fails conservative tx will order Mri for injectio planning (hopefully therapy will assess in finding pain source as unclear.  5. Pending CT abdomen and pelvis with IV contrast to r/o mass?    Follow up:  Follow up in 6 weeks or prn.    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  She pain first started on 3 months ago, without inciting event. The pain as located in the b/l Low back below the pant line.    Pain:        Severity:  Lina Sharma states pain is: 10/10 at it's worst. On average pain is 7/10 (Scale 0-no pain, 10-worst pain)      Quality:  aching.       Radiating: from back to front(groin) (L>R)      Aggravating Factors:  bending over, prolonged sitting      Alleviating Factors:  walking, gabepntin      Time: first thing in the AM      Associated symptoms:  no numbness, or tingling in the bilateral lower extremities; no balance problems or new bowel/bladder problems.    Physical Therapy/Occupational Therapy/Other Modalities:    -  Chiropractor/OMT: Dr. Ann Hernandez 3 sessions wasn't helpful  - Acupuncture: No  - Medical message: No    PT session: starting tomorrow      Topicals:   ICE/Heat: heta hasn' t helped  Topicals (Capsicin/Diclofenac gel/Salonpas/Lidocaine): biofreeze hasn't helped    Medications:  Tylenol: tylenol can give false reading on glucometer  NSAIDs: 2x toradol injection, meloxicam 15mg every day no relief  Steroid: Hasn't tried  Gabapentin/Lyrica: taking gabapentin with minimal relief [id number]  Muscle relaxer: flexeril n relief   Duloxetine/Topamax/oxcarbazepine: hasn't tried     Current Outpatient Medications:     albuterol (ProAir HFA) 90 mcg/actuation inhaler, , Disp: , Rfl:     atorvastatin (Lipitor) 20 mg tablet, Take by mouth once daily., Disp: , Rfl:     Bifidobacterium infantis (ALIGN) 4 mg capsule, Take by mouth., Disp: , Rfl:     buPROPion XL (Wellbutrin XL) 150 mg 24 hr tablet, Take by mouth once daily., Disp: , Rfl:     calcium carbonate (Tums) 200 mg calcium chewable tablet, Chew 1 tablet (500 mg) if needed., Disp: , Rfl:     cetirizine (ZyrTEC) 10 mg capsule, Take 1 capsule (10 mg) by mouth once daily., Disp: , Rfl:     chlorpheniramine (Chlor-Trimeton) 4 mg tablet, Take 2 tablets (8 mg) by mouth 2 times a day., Disp: , Rfl:     cholecalciferol (Vitamin D-3) 50 MCG (2000 UT) tablet, Take 1 tablet (2,000 Units) by mouth once daily., Disp: , Rfl:     ciprofloxacin (Ciloxan) 0.3 % ophthalmic solution, Administer into affected eye(s)., Disp: , Rfl:     cycloSPORINE 0.05 % drops, Administer 1 drop into both eyes every 12 hours., Disp: , Rfl:     esomeprazole (NexIUM) 40 mg DR capsule, TAKE 1 CAPSULE BY MOUTH EVERY DAY, Disp: 90 capsule, Rfl: 3    ferrous sulfate 325 (65 Fe) MG tablet, Take 1 tablet by mouth once daily with a meal., Disp: , Rfl:     FLUoxetine (PROzac) 20 mg capsule, Take by mouth., Disp: , Rfl:     FLUoxetine (PROzac) 40 mg capsule, TAKE 1 CAPSULE BY MOUTH EVERY DAY, Disp: 90 capsule, Rfl: 3     fluticasone (Flonase) 50 mcg/actuation nasal spray, Administer into affected nostril(s) once daily., Disp: , Rfl:     gabapentin (Neurontin) 300 mg capsule, Take 300 mg am, take 300 mg afternoon, and take 600 mg HS, Disp: 360 capsule, Rfl: 3    glucagon 1 mg injection, Glucagon Emergency 1 MG Injection Kit Refills: 0, Disp: , Rfl:     insulin aspart (NovoLOG U-100 Insulin aspart) 100 unit/mL injection, 70 units daily via insulin pump, Disp: 60 mL, Rfl: 0    insulin glargine (Toujeo SoloStar U-300 Insulin) 300 unit/mL (1.5 mL) injection, , Disp: , Rfl:     L. acidophilus/Bifid. animalis 32 billion cell capsule, Take 1 capsule by mouth once daily., Disp: , Rfl:     levothyroxine (Synthroid, Levoxyl) 137 mcg tablet, TAKE 1 TABLET BY MOUTH EVERY DAY, Disp: 90 tablet, Rfl: 3    loratadine (Claritin Liqui-Gel) 10 mg capsule, Take 1 capsule by mouth once daily., Disp: , Rfl:     meloxicam (Mobic) 15 mg tablet, TAKE 1 TABLET EVERY DAY AS NEEDED, Disp: 90 tablet, Rfl: 3    moxifloxacin (Vigamox) 0.5 % ophthalmic solution, Administer 1 drop into both eyes if needed., Disp: , Rfl:     OneTouch Ultra Test strip, check 4 times a day. 250.01. Insulin pump., Disp: , Rfl:     Restasis 0.05 % ophthalmic emulsion, Administer 1 drop into both eyes every 12 hours., Disp: , Rfl:     rosuvastatin (Crestor) 10 mg tablet, Take by mouth., Disp: , Rfl:     sod picosulf-mag ox-citric ac 10 mg-3.5 gram- 12 gram/160 mL solution, Take by mouth., Disp: , Rfl:     spironolactone (Aldactone) 50 mg tablet, Take 1 tablet (50 mg) by mouth once daily., Disp: , Rfl:     Allergies   Allergen Reactions    Desvenlafaxine Unknown    Pollen Extracts Unknown    Sertraline Diarrhea    Spironolactone Unknown       Injections:    Date/Injection Type/Location/%relief/ Lasting  - no back inj  Surgery:  Date/Type/Location/%relief/ Lasting    Past Surgical History:   Procedure Laterality Date    BELT ABDOMINOPLASTY  07/10/2018    abdominplasty and breast  augmentation.     SECTION, CLASSIC  07/10/2018     Section    COLONOSCOPY  2021: polyp (5 years) 2021: - The examined portion of the ileum was normal.                        - Non-bleeding internal hemorrhoids. 10 y repeat    ESOPHAGOGASTRODUODENOSCOPY  2022:  - Medium-sized hiatal hernia. GE junction is at 36cm                        - Gastritis. Biopsied.                        - Normal examined duodenum. : Medium-sized hiatal hernia. GE junction is at 36cm                        - Gastritis. Biopsied.                        - Normal examined duodenum.    POLYSOMNOGRAPHY       PSG: Moderate PRERNA CPAP rec'd    TONSILLECTOMY  07/10/2018    Tonsillectomy With Adenoidectomy    TUBAL LIGATION  07/10/2018    Tubal Ligation       Past Medical History:   Diagnosis Date    Abnormal screening cardiac CT     :1. Coronary artery calcium score of 13* (LAD) 2. PARK 83rd percentile** for age, gender, and race in asymptomatic patients. *Coronary Artery  Agatston Score risk:Very low 1-99 Small hiatal hernia. 1.2 cm hepatic cysts and several too small to characterize liver hypodensities. A 1.7 cm round hypodensity at the dome of the liver laterally does not meet the criteria of a simple cyst.    H/O abdominal ultrasound     : US abd: Cyst in the superior right hepatic lobe measures 1.8 cm. There is a possible background of hepatic steatosis. No other focal hepatic abnormality was demonstrated    H/O bone density study 2021:Lowest T score -1.4. FRAX* 10-year Probability of Fracture Based on femoral neck BMD: DualFemur (Left) Major Osteoporotic Fracture: 11.8% Hip Fracture:                1.1% : -1.7:  FRACTURE RISK (based on FRAX):                       10-year absolute fracture risk:                            - major osteoporotic fracture = 5.8 %                            - hip fracture = 0.5 %    H/O chest x-ray     3/17:  normal    H/O CT scan  of chest     1/20: IMPRESSION: 1. 6 mm ground-glass nodule within the apical segment of the left lower lobe, stable from 2011, most likely benign given its stability. No additional pulmonary nodules or masses identified. 2. Mild scattered areas of atelectasis in both lungs as above. Lungs are otherwise clear of focal infiltrate. 3. Trace pericardial effusion. 4. Age indeterminate mild compression deformityT8.    H/O echocardiogram     11/11: normal    H/O magnetic resonance imaging of brain and brain stem 08/28/2023    There is no evidence of mass, infarction or hemorrhage.    H/O magnetic resonance imaging of brain and brain stem 08/28/2023    There is no evidence of mass, infarction or hemorrhage    H/O pelvic ultrasound     11/21: IMPRESSION: Endometrium measures up to 11 mm in thickness, as described above. This is abnormal in the postmenopausal patient with uterine bleeding.  Leiomyomatous uterus, as discussed above.  Simple left ovarian cyst measures up to 2.7 cm; surveillance pelvic ultrasound is recommended in 1 year for this finding.    H/O x-ray of lumbar spine 12/2023 1/2.Degenerative changes as described. Approximate 4 mm osseous density at the anterior superior aspect of the L5 vertebral body may represent fracture fragment of indeterminate age. Correlation with CT may be considered for further assessment as clinically warranted.    H/O x-ray of lumbar spine 12/2023 2/2.1.5 cm ovoid calcific density overlying the pelvis of uncertain etiology and significance.         ROS:  - Sleep: Sleep is disrupted secondary to pain  - Bowel/Bladder: Denies bowel/bladder dysfunction  - Falls: Reports frequent falls (4 times a year)  - Weight changes: Denies  - Mood/Psych: Denies any feelings of anxiety or depression    12-point review of systems was completed and is otherwise negative except as noted in the HPI.      Social Hx:  - Home: Lives with 2 kids  - ADLs: Independent in ADLs and ambulation without  assistive device.   - Hobbies: orange theory (can't participate due to pain)  - Work:  teacher (primary school)  - Smoking/Alcohol/Drugs: No/No/No    Objective     Physical Exam:  General:  Appears state age, in NAD, and well nourished  Psychological:  Normal mood and affect  Pulm:  Breathing comfortably on RA    Gait:   - Normal   - Without assistive device   - able to heel walk, able to toe walk    Inspection:   - No erythema, swelling/edema, ecchymosis or deformity noted  - normal muscle bulk of bilateral lower extremity     Sensation:  - Intact to light touch in bilateral lower extremity    Palpation:  - No tenderness to palpation of bilateral greater trochanter  - Tenderness to palpation of bilateral sacroiliac joint  - Tenderness to palpation of bilateral spinal paraspinals at the level of L4-L5  Range of Motion:  - Full  thoracolumbar flexion (painful)/extension/rotation/sidebending  - Full painful in all planes  bilateral hip flexion/internal rotation/external rotation    Strength:  Side Hip Flexion (L2) Knee Extension (L3) Ankle Dorsiflexion (L4) Great Toe extension (L5) Ankle Plantarflexion  (S1) Hip Adduction  (L2-L4) Hip Abduction  (L5-S1)   Right 5 5 NT NT NT 5 5   Left 5 5 NT NT NT 5 5     Reflexes:  Side Patellar (L4) Achilles (S1)  Medial hamstring(L5)   Right NT NT NT   Left NT NT NT     Special tests:  - Disc/Nerve root (SLR): Neg b/l  - Facet loading: Neg b/l   - SI Joint (Compression, Distraction, Gaenslen, and Thigh thrust): Neg on right   - MELI: painful but does not correlate with chief complaint   - FADIR: painful but does not correlate with chief complaint      Imaging and Other Studies:    XR lumbar spine complete 4+ views    Result Date: 12/5/2023  Interpreted By:  Lloyd Butterfield, STUDY: XR LUMBAR SPINE COMPLETE 4+ VIEWS; ;  12/4/2023 9:39 am   INDICATION: Signs/Symptoms:lumbar back pain x 2 months.   COMPARISON: None.   ACCESSION NUMBER(S): [id number]   ORDERING CLINICIAN: REBECCA CRANE    FINDINGS: Five view lumbar spine   Marked disc space narrowing L5-S1 with mild retrolisthesis L5 on S1. Mild disc space narrowing L4-L5. Otherwise no definite significant subluxation or compression deformity. Facet arthropathy most notably lower lumbar spine. Mild disc space narrowing at the lower thoracic spine and a few additional lumbar levels. Mild spondylosis. Approximate 4 mm osseous density at the anterior superior aspect of the L5 vertebral body as shown on the lateral projection, could represent small fracture fragment of indeterminate age. 1.5 cm ovoid calcific density overlying the pelvis of uncertain etiology and significance.   Visualized bowel gas pattern is nonspecific. Partially imaged degenerative change of the hip joints. 1 image shows an apparent electronic device overlying the lower thoracic/upper lumbar spine and may be in the soft tissues.       Degenerative changes as described. Approximate 4 mm osseous density at the anterior superior aspect of the L5 vertebral body may represent fracture fragment of indeterminate age. Correlation with CT may be considered for further assessment as clinically warranted.   1.5 cm ovoid calcific density overlying the pelvis of uncertain etiology and significance.     MACRO: None   Signed by: Lloyd Butterfield 12/5/2023 12:23 PM Dictation workstation:   [id number]

## 2023-12-27 NOTE — PROGRESS NOTES
Physical Therapy    Physical Therapy Evaluation and Treatment      Patient Name: Lina Sharma  MRN: [id number]  Today's Date: 12/28/2023  Time Calculation  Start Time: 1439  Stop Time: 1513  Time Calculation (min): 34 min    Insurance:  [facility]  $15 copay, 0% coinsurance  60 V/Y  PA not req  Visit: 1  POC: 8    Assessment:  59 y/o F presents to outpatient physical therapy with reports of bilateral low back pain with possible SIJ involvement. The patient presents with the current impairments of pain, decreased ROM, weakness, impaired mobility, and increased muscular tension. These impairments currently limit their ability to stand for extended periods of time, ambulate, bend, lift, negotiate stairs, and sit for extended periods of time. Due to the limitations listed above, the patient is currently at a decreased functional level compared to baseline, and they would benefit from skilled physical therapy to improve pain intensity, strength, flexibility, mobility, and facilitate a safe and efficient return to functional baseline. Patient's prognosis for improvement with therapy is fair at this time.  PT Assessment  PT Assessment Results: Decreased strength, Decreased range of motion, Decreased endurance, Decreased mobility, Pain  Rehab Prognosis: Good  Evaluation/Treatment Tolerance: Patient limited by pain     Plan:  OP PT Plan  Treatment/Interventions: Aquatic therapy, Cryotherapy, Dry needling, Education/ Instruction, Electrical stimulation, Hot pack, Manual therapy, Mechanical traction, Neuromuscular re-education, Self care/ home management, Taping techniques, Therapeutic activities, Therapeutic exercises, Ultrasound, Vasopneumatic device  PT Plan: Skilled PT  PT Frequency: 1 time per week  Duration: 7 visits  Onset Date: 12/04/23  Certification Period Start Date: 12/28/23  Certification Period End Date: 03/27/24  Rehab Potential: Good  Plan of Care Agreement: Patient    Complexity:  Low      Current Problem:    1. Chronic bilateral low back pain without sciatica  Referral to Physical Therapy    Follow Up In Physical Therapy          Subjective    Patient reports to OPPT with complaints of low back pain since October. Pain started just on the right but has since moved to both sides. She went to urgent care back in October and received an injection that relieved the pain for two weeks and it has since come back and is now on both the right and the left. She does have a history of restless leg syndrome for years, but denies any shooting pain down the legs. Pain does endorse continuous radiating pain into the anterior hips bilaterally. Pain is 7/10 at this time, 8/10 at worst, 5/10 at best. Increased pain with bending, lifting, sitting (at most an hour), standing (at most 10 minutes), and sleeping.   General:  General  Reason for Referral: Low back pain  Referred By: JOSE Mzea  Past Medical History Relevant to Rehab: hx of diabetes, HTN, thyroid disorder  Precautions:  Precautions  STEADI Fall Risk Score (The score of 4 or more indicates an increased risk of falling): 8  Precautions Comment: hx of diabetes, HTN, thyroid disorder  Pain:  Pain Assessment  Pain Assessment: 0-10  Pain Score: 7  Pain Type: Chronic pain  Pain Location: Back  Pain Orientation: Lower, Right, Left  Pain Descriptors: Cramping, Sharp  Home Living:  Home Living  Home Living Comment: Patient lives with her two children who can provide assistance at home. Stairs in the home that she is negotiating with step to pattern down, reciprocally going up  Prior Level of Function:  Prior Function Per Pt/Caregiver Report  Prior Function Comments: Indpendent with all ADLs and desired activities    Objective   Lumbar AROM:  Flexion to ankles*  Extension limited 25%*  RSB to distal thigh*  LSB to distal thigh*    LE Strength:  Hip flexion R 4/5, L 4/5  Hip abd R 4-/5, L 4/5  Hip ext R 4-/5, L 4-/5  Knee flexion R 4+/5, L 4+/5  Knee extension R 4+/5, L  4+/5    HS length (90/90)  R -15  L -16    Response to repeated movements:    Gait: increased lumbar extension without fwd trunk lean, decreased step length bilaterally, decreased trailing limb  Dermatomes: intact  Palpation: TTP to bilateral PSIS, bilateral lumbar paraspinals, and bilateral QL, mild tenderness to lateral border of the sacrum  Special Tests: + sacral thrust, + thigh thrust, - compression, - gapping, - slump,   Observation: R anteriorly rotated illium (MET to correct without reduction in symptoms)    Outcome Measures:  Other Measures  Oswestry Disablity Index (DELROY): 27; 54%     EDUCATION:  Outpatient Education  Individual(s) Educated: Patient  Education Provided: Anatomy, Body Mechanics, Home Exercise Program, Physiology, POC, Posture  Risk and Benefits Discussed with Patient/Caregiver/Other: yes  Patient/Caregiver Demonstrated Understanding: yes  Plan of Care Discussed and Agreed Upon: yes  Patient Response to Education: Patient/Caregiver Verbalized Understanding of Information, Patient/Caregiver Asked Appropriate Questions    Goals:  By discharge:  1. Patient will report and demonstrate independence with established HEP  2. Patient will demonstrate ability to bend and lift 10lb from the floor to waist height without an increase in low back pain  3. Patient will increase gross hip and knee strength to >/= 4+/5 to improve their ability to stand, ambulate, lift, and perform all work duties without restrictions  4. Patent will report improvement in low back pain by 75% since the start of therapy to allow for an increased ability to perform ADLs and work duties  5. Patient will demonstrate a decrease in Modified Oswestry Low Back Disability Index score by 10 pts to 17/50 to meet established MCID for the outcome measure (baseline 12/28/23 27/50)  6. Patient will report the ability to sleep through the night 4/7 nights per week uninterrupted by pain to improve overall function throughout the day  7.  Patient will demonstrate symmetrical and pain free AROM of the lumbar spine to improve her ability to bend, lift, dress, and perform all daily tasks without restrictions  8. Patient will report the ability to remain seated >/= 1 hour without having to take a break due to increased pain in the lumbar spine to improve her tolerance to at home activities  9. Patient will report the ability to remain standing >/= 30 minutes without pain in the low back exceeding 3/10 to improve her tolerance to activities and chores within the home

## 2023-12-28 ENCOUNTER — EVALUATION (OUTPATIENT)
Dept: PHYSICAL THERAPY | Facility: CLINIC | Age: 58
End: 2023-12-28
Payer: COMMERCIAL

## 2023-12-28 DIAGNOSIS — M54.50 CHRONIC BILATERAL LOW BACK PAIN WITHOUT SCIATICA: ICD-10-CM

## 2023-12-28 DIAGNOSIS — G89.29 CHRONIC BILATERAL LOW BACK PAIN WITHOUT SCIATICA: ICD-10-CM

## 2023-12-28 PROBLEM — M89.9 BONE LESION: Status: ACTIVE | Noted: 2023-12-28

## 2023-12-28 PROBLEM — R93.5 ABNORMAL CT OF THE ABDOMEN: Status: ACTIVE | Noted: 2023-12-28

## 2023-12-28 PROCEDURE — 97161 PT EVAL LOW COMPLEX 20 MIN: CPT | Mod: GP | Performed by: PHYSICAL THERAPIST

## 2023-12-28 ASSESSMENT — ENCOUNTER SYMPTOMS
DEPRESSION: 1
OCCASIONAL FEELINGS OF UNSTEADINESS: 1
LOSS OF SENSATION IN FEET: 0

## 2023-12-28 ASSESSMENT — PAIN DESCRIPTION - DESCRIPTORS: DESCRIPTORS: CRAMPING;SHARP

## 2023-12-28 ASSESSMENT — PAIN - FUNCTIONAL ASSESSMENT: PAIN_FUNCTIONAL_ASSESSMENT: 0-10

## 2023-12-28 ASSESSMENT — PAIN SCALES - GENERAL: PAINLEVEL_OUTOF10: 7

## 2023-12-28 NOTE — PATIENT INSTRUCTIONS
Access Code: [email or web address]  URL: [email or web address]  Date: 12/28/2023  Prepared by: Colt Quintanilla    Exercises  - Supine Lower Trunk Rotation  - 1 x daily - 7 x weekly - 1 sets - 10 reps - 5 sec hold  - Supine Single Knee to Chest Stretch  - 1 x daily - 7 x weekly - 1 sets - 10 reps - 5 sec hold  - Supine Bridge  - 1 x daily - 7 x weekly - 1 sets - 10-15 reps - 3 sec hold  - Hooklying Clamshell with Resistance  - 1 x daily - 7 x weekly - 1 sets - 15 reps - 5 sec hold  - Supine Hip Adduction Isometric with Ball  - 1 x daily - 7 x weekly - 1 sets - 15 reps - 5 sec hold

## 2023-12-29 PROBLEM — R09.02 HYPOXIA: Status: RESOLVED | Noted: 2023-10-04 | Resolved: 2023-12-29

## 2023-12-29 PROBLEM — S49.90XA INJURY OF UPPER EXTREMITY: Status: RESOLVED | Noted: 2023-10-04 | Resolved: 2023-12-29

## 2023-12-29 PROBLEM — E66.3 OVERWEIGHT: Status: RESOLVED | Noted: 2023-07-08 | Resolved: 2023-12-29

## 2023-12-29 PROBLEM — R69 DISEASE SUSPECTED: Status: RESOLVED | Noted: 2023-10-04 | Resolved: 2023-12-29

## 2023-12-29 PROBLEM — M13.0 POLYARTHRITIS: Status: RESOLVED | Noted: 2023-10-04 | Resolved: 2023-12-29

## 2023-12-29 PROBLEM — N94.9 FEMALE GENITAL SYMPTOMS: Status: RESOLVED | Noted: 2023-10-04 | Resolved: 2023-12-29

## 2023-12-29 PROBLEM — M25.562 CHRONIC PAIN OF BOTH KNEES: Status: RESOLVED | Noted: 2023-10-04 | Resolved: 2023-12-29

## 2023-12-29 PROBLEM — Z86.16 HISTORY OF SEVERE ACUTE RESPIRATORY SYNDROME CORONAVIRUS 2 (SARS-COV-2) DISEASE: Status: RESOLVED | Noted: 2023-10-04 | Resolved: 2023-12-29

## 2023-12-29 PROBLEM — E10.65 UNCONTROLLED TYPE 1 DIABETES MELLITUS WITH HYPERGLYCEMIA (MULTI): Status: RESOLVED | Noted: 2023-10-04 | Resolved: 2023-12-29

## 2023-12-29 PROBLEM — R26.89 IMPAIRMENT OF BALANCE: Status: RESOLVED | Noted: 2023-10-04 | Resolved: 2023-12-29

## 2023-12-29 PROBLEM — R53.83 FATIGUE: Status: RESOLVED | Noted: 2023-10-04 | Resolved: 2023-12-29

## 2023-12-29 PROBLEM — R92.8 ABNORMAL MAMMOGRAM: Status: RESOLVED | Noted: 2023-10-04 | Resolved: 2023-12-29

## 2023-12-29 PROBLEM — R92.1 CALCIFICATION OF BREAST: Status: RESOLVED | Noted: 2023-10-04 | Resolved: 2023-12-29

## 2023-12-29 PROBLEM — M25.561 CHRONIC PAIN OF BOTH KNEES: Status: RESOLVED | Noted: 2023-10-04 | Resolved: 2023-12-29

## 2023-12-29 PROBLEM — Z97.8 USES SELF-APPLIED CONTINUOUS GLUCOSE MONITORING DEVICE: Status: RESOLVED | Noted: 2023-10-04 | Resolved: 2023-12-29

## 2023-12-29 PROBLEM — M19.90 ARTHRITIS: Status: RESOLVED | Noted: 2023-07-08 | Resolved: 2023-12-29

## 2023-12-29 PROBLEM — D75.1 ERYTHROCYTOSIS: Status: RESOLVED | Noted: 2023-10-04 | Resolved: 2023-12-29

## 2023-12-29 PROBLEM — G89.29 CHRONIC PAIN OF BOTH KNEES: Status: RESOLVED | Noted: 2023-10-04 | Resolved: 2023-12-29

## 2023-12-29 PROBLEM — R25.1 TREMOR: Status: RESOLVED | Noted: 2023-10-04 | Resolved: 2023-12-29

## 2023-12-29 PROBLEM — Z96.41 PRESENCE OF INSULIN PUMP: Status: RESOLVED | Noted: 2023-07-08 | Resolved: 2023-12-29

## 2023-12-29 PROBLEM — R93.7 ABNORMAL X-RAY OF LUMBAR SPINE: Status: RESOLVED | Noted: 2023-12-06 | Resolved: 2023-12-29

## 2023-12-29 PROBLEM — S99.929A INJURY OF GREAT TOE: Status: RESOLVED | Noted: 2023-10-04 | Resolved: 2023-12-29

## 2023-12-29 PROBLEM — S62.109A FRACTURE OF WRIST: Status: RESOLVED | Noted: 2023-10-04 | Resolved: 2023-12-29

## 2023-12-29 PROBLEM — K76.9 LIVER DISEASE: Status: RESOLVED | Noted: 2022-08-15 | Resolved: 2023-12-29

## 2023-12-29 PROBLEM — M89.9 DISORDER OF BONE: Status: RESOLVED | Noted: 2023-07-08 | Resolved: 2023-12-29

## 2023-12-29 PROBLEM — L64.9 ANDROGENIC ALOPECIA: Status: RESOLVED | Noted: 2023-10-04 | Resolved: 2023-12-29

## 2023-12-29 PROBLEM — S90.30XA CONTUSION OF FOOT: Status: RESOLVED | Noted: 2023-10-04 | Resolved: 2023-12-29

## 2023-12-29 PROBLEM — D25.9 UTERINE FIBROID: Status: RESOLVED | Noted: 2023-10-04 | Resolved: 2023-12-29

## 2023-12-29 PROBLEM — K76.0 FATTY LIVER: Status: RESOLVED | Noted: 2023-10-04 | Resolved: 2023-12-29

## 2023-12-29 PROBLEM — G25.81 RESTLESS LEG SYNDROME: Status: RESOLVED | Noted: 2023-08-07 | Resolved: 2023-12-29

## 2023-12-29 PROBLEM — M79.673 PAIN OF FOOT: Status: RESOLVED | Noted: 2023-10-04 | Resolved: 2023-12-29

## 2023-12-29 PROBLEM — R06.83 SNORING: Status: RESOLVED | Noted: 2023-10-04 | Resolved: 2023-12-29

## 2023-12-29 PROBLEM — E03.9 HYPOTHYROIDISM, ADULT: Status: RESOLVED | Noted: 2023-10-04 | Resolved: 2023-12-29

## 2023-12-29 PROBLEM — N95.0 POSTMENOPAUSAL BLEEDING: Status: RESOLVED | Noted: 2023-10-04 | Resolved: 2023-12-29

## 2023-12-29 PROBLEM — R40.0 DAYTIME SOMNOLENCE: Status: RESOLVED | Noted: 2023-10-04 | Resolved: 2023-12-29

## 2024-01-04 ENCOUNTER — OFFICE VISIT (OUTPATIENT)
Dept: ENDOCRINOLOGY | Facility: CLINIC | Age: 59
End: 2024-01-04
Payer: COMMERCIAL

## 2024-01-04 ENCOUNTER — ANCILLARY PROCEDURE (OUTPATIENT)
Dept: RADIOLOGY | Facility: CLINIC | Age: 59
End: 2024-01-04
Payer: COMMERCIAL

## 2024-01-04 VITALS
BODY MASS INDEX: 28.63 KG/M2 | SYSTOLIC BLOOD PRESSURE: 130 MMHG | HEART RATE: 88 BPM | HEIGHT: 70 IN | DIASTOLIC BLOOD PRESSURE: 84 MMHG | WEIGHT: 200 LBS

## 2024-01-04 DIAGNOSIS — Z13.820 SCREENING FOR OSTEOPOROSIS: ICD-10-CM

## 2024-01-04 DIAGNOSIS — M13.0 POLYARTHROPATHY: ICD-10-CM

## 2024-01-04 DIAGNOSIS — Z00.00 ROUTINE ADULT HEALTH MAINTENANCE: ICD-10-CM

## 2024-01-04 DIAGNOSIS — E10.3299 TYPE 1 DIABETES MELLITUS WITH MILD NONPROLIFERATIVE RETINOPATHY WITHOUT MACULAR EDEMA, UNSPECIFIED LATERALITY (MULTI): Primary | ICD-10-CM

## 2024-01-04 LAB — POC HEMOGLOBIN A1C: 6.9 % (ref 4.2–6.5)

## 2024-01-04 PROCEDURE — 3075F SYST BP GE 130 - 139MM HG: CPT | Performed by: STUDENT IN AN ORGANIZED HEALTH CARE EDUCATION/TRAINING PROGRAM

## 2024-01-04 PROCEDURE — 3079F DIAST BP 80-89 MM HG: CPT | Performed by: STUDENT IN AN ORGANIZED HEALTH CARE EDUCATION/TRAINING PROGRAM

## 2024-01-04 PROCEDURE — 99214 OFFICE O/P EST MOD 30 MIN: CPT | Performed by: STUDENT IN AN ORGANIZED HEALTH CARE EDUCATION/TRAINING PROGRAM

## 2024-01-04 PROCEDURE — 1036F TOBACCO NON-USER: CPT | Performed by: STUDENT IN AN ORGANIZED HEALTH CARE EDUCATION/TRAINING PROGRAM

## 2024-01-04 PROCEDURE — 77080 DXA BONE DENSITY AXIAL: CPT

## 2024-01-04 PROCEDURE — 83036 HEMOGLOBIN GLYCOSYLATED A1C: CPT | Performed by: STUDENT IN AN ORGANIZED HEALTH CARE EDUCATION/TRAINING PROGRAM

## 2024-01-04 PROCEDURE — 77080 DXA BONE DENSITY AXIAL: CPT | Performed by: RADIOLOGY

## 2024-01-04 ASSESSMENT — ENCOUNTER SYMPTOMS: CONSTITUTIONAL NEGATIVE: 1

## 2024-01-04 NOTE — PROGRESS NOTES
Subjective   Patient ID: Lina Sharma is a 58 y.o. female who presents for Follow-up (Follow up diabetes//Last A1C 2023  7.9%//No other concerns/).  HPI  The patient tis a 58 yo female with DM1 diagnosed at age 15 , presents for follow up .   She is on Tandem Controlled IQ ,however her pump  last week and is on MDI until she gets her replacement        she has back pain for which she saw [facility] and chiropracter , she also has coordination issues and is referred to neurology by pcp    She previously followed with Paty MEDEIROS.  current pump settings :  basal :   midnight 0.7 u /hr    8:00 1.2 u/hr    12 pm 0.4 u/hr   6 pm 1.1 u/hr   8 pm 1.2 u/hr  correction factor 1:32  carb ratio 1:7  target 110        previous history :  She was on Medtronic pump years ago and it was stopped at least 7 years ago ( she states at that time she had a abdominoplasty and was working out and pump wouldn't't stay in place.     previous regimen :  Toujeo 36   Fiasp 1:5 carb , with correction 1 unit for every 25 > 150      no known diabetic neuropathy however she takes gabapentin 600 mg at bedtime for restless leg syndrome , nephropathy or retinopathy          Review of Systems   Constitutional: Negative.        Objective   Physical Exam  Constitutional:       Appearance: Normal appearance.   Cardiovascular:      Rate and Rhythm: Normal rate and regular rhythm.   Pulmonary:      Effort: Pulmonary effort is normal.      Breath sounds: Normal breath sounds.   Neurological:      Mental Status: She is alert.   Psychiatric:         Mood and Affect: Mood normal.         Assessment/Plan        The patient tis a 59 yo female with DM1 diagnosed at age 15 , presents for follow up . She is on tandem controlled IQ however her pump  last week and is on MDI until she gets her replacement    - Better controlled DM1 on insulin pump Tandem controlled IQ A1c of 6.9 was 7 %,  continue current setting , has a copy of her settings to  transfer to new pump  - Hypothyroidism euthyroid on LT4 137 mcg 7 pills/ week   - Restless leg , ? neuropathy on gabapentin. She will establish with neurology   -  Generalized arthritis , advised to start OTC Glucosamine  Chondroitin supplements   - HLD , uncontrolled , off Rosuvastatin 10 mg once a week with pcp , had history of severe muscle cramps on statins in the past she doesn't want to reconsider statins . calcium score is 13- yearly recheck ordered      RTC in 3 months     Isabella Smiley MD 01/04/24 12:08 PM

## 2024-01-10 ENCOUNTER — TREATMENT (OUTPATIENT)
Dept: PHYSICAL THERAPY | Facility: CLINIC | Age: 59
End: 2024-01-10
Payer: COMMERCIAL

## 2024-01-10 DIAGNOSIS — M54.50 CHRONIC BILATERAL LOW BACK PAIN WITHOUT SCIATICA: Primary | ICD-10-CM

## 2024-01-10 DIAGNOSIS — G89.29 CHRONIC BILATERAL LOW BACK PAIN WITHOUT SCIATICA: Primary | ICD-10-CM

## 2024-01-10 PROCEDURE — 97110 THERAPEUTIC EXERCISES: CPT | Mod: GP,CQ

## 2024-01-10 ASSESSMENT — PAIN - FUNCTIONAL ASSESSMENT: PAIN_FUNCTIONAL_ASSESSMENT: 0-10

## 2024-01-10 ASSESSMENT — PAIN SCALES - GENERAL: PAINLEVEL_OUTOF10: 5 - MODERATE PAIN

## 2024-01-10 NOTE — PROGRESS NOTES
"Physical Therapy    Physical Therapy Treatment    Patient Name: Lina Sharma  MRN: [id number]  Today's Date: 1/10/2024  Time Calculation  Start Time: 0318  Stop Time: 0400  Time Calculation (min): 42 min  Insurance:  [employer]  $15 copay, 0% coinsurance  60 V/Y  PA not req  Visit: 2  POC: 8    Assessment: Pelvis = after MET. Pt sofia added there ex and increased core strengthening w/o increased pain.  Patient would benefit from P.T. to continue to address impairments in order to improve strength, flexibility, posture, and  to decrease symptoms and increase overall function.       Plan:  OP PT Plan  PT Plan: Skilled PT    Current Problem  1. Chronic bilateral low back pain without sciatica  Follow Up In Physical Therapy        General          Subjective    Pt reports \"it's 100% better since last visit.\" Notes 0/10 pain currently but still about 5/10 LBP getting up in the morning and then it loosens up. Reports working out 2xs since last tx w/o issue but has not done HEP since bc it was feeling better.    Precautions  Precautions  STEADI Fall Risk Score (The score of 4 or more indicates an increased risk of falling): 8  Precautions Comment: hx of diabetes, HTN, thyroid disorderSTEADI Fall Risk Score (The score of 4 or more indicates an increased risk of falling): 8  Precautions Comment: hx of diabetes, HTN, thyroid disorder     Pain  Pain Assessment  Pain Assessment: 0-10  Pain Score: 5 - Moderate pain  Pain Type: Chronic pain  Pain Location: Back    Objective   L long-short.    Pt education to do HEP to get core stronger to protect back.      Treatments:  Supine Hip ABD/ Adduction Isometric vs BLK/ PinkBall   Hooklying Clamshell with RTB x15ea  Supine Lower Trunk Rotation 10\"x5   Supine Bridge  5\"x15  Supine Lower Trunk Rotation   Recumb Bike (SH:17.5) L7 x5'  DLS March 2x10ea  Supine Single Knee to Chest Stretch 10\"x5  Hamstring stretch B 3x30\"ea  Midrow w/abdominal brace NV  LAE w/abdominal brace  " NV  Shoulder ADD w/abdominal brace       OP EDUCATION:     Access Code: [email or web address]  URL: [email or web address]  Date: 12/28/2023  Prepared by: Colt Quintanilla     Exercises  - Supine Lower Trunk Rotation  - 1 x daily - 7 x weekly - 1 sets - 10 reps - 5 sec hold  - Supine Single Knee to Chest Stretch  - 1 x daily - 7 x weekly - 1 sets - 10 reps - 5 sec hold  - Supine Bridge  - 1 x daily - 7 x weekly - 1 sets - 10-15 reps - 3 sec hold  - Hooklying Clamshell with Resistance  - 1 x daily - 7 x weekly - 1 sets - 15 reps - 5 sec hold  - Supine Hip Adduction Isometric with Ball  - 1 x daily - 7 x weekly - 1 sets - 15 reps - 5 sec hold        Goals:  By discharge:  1. Patient will report and demonstrate independence with established HEP  2. Patient will demonstrate ability to bend and lift 10lb from the floor to waist height without an increase in low back pain  3. Patient will increase gross hip and knee strength to >/= 4+/5 to improve their ability to stand, ambulate, lift, and perform all work duties without restrictions  4. Patent will report improvement in low back pain by 75% since the start of therapy to allow for an increased ability to perform ADLs and work duties  5. Patient will demonstrate a decrease in Modified Oswestry Low Back Disability Index score by 10 pts to 17/50 to meet established MCID for the outcome measure (baseline 12/28/23 27/50)  6. Patient will report the ability to sleep through the night 4/7 nights per week uninterrupted by pain to improve overall function throughout the day  7. Patient will demonstrate symmetrical and pain free AROM of the lumbar spine to improve her ability to bend, lift, dress, and perform all daily tasks without restrictions  8. Patient will report the ability to remain seated >/= 1 hour without having to take a break due to increased pain in the lumbar spine to improve her tolerance to at home activities  9. Patient will report the ability to remain  standing >/= 30 minutes without pain in the low back exceeding 3/10 to improve her tolerance to activities and chores within the home

## 2024-01-16 ENCOUNTER — APPOINTMENT (OUTPATIENT)
Dept: PHYSICAL THERAPY | Facility: CLINIC | Age: 59
End: 2024-01-16
Payer: COMMERCIAL

## 2024-01-18 ENCOUNTER — HOSPITAL ENCOUNTER (OUTPATIENT)
Dept: RADIOLOGY | Facility: HOSPITAL | Age: 59
Discharge: HOME | End: 2024-01-18
Payer: COMMERCIAL

## 2024-01-18 DIAGNOSIS — M54.50 CHRONIC LOW BACK PAIN WITHOUT SCIATICA, UNSPECIFIED BACK PAIN LATERALITY: ICD-10-CM

## 2024-01-18 DIAGNOSIS — E83.52 HYPERCALCEMIA: ICD-10-CM

## 2024-01-18 DIAGNOSIS — R93.5 ABNORMAL CT OF THE ABDOMEN: ICD-10-CM

## 2024-01-18 DIAGNOSIS — M89.9 BONE LESION: ICD-10-CM

## 2024-01-18 DIAGNOSIS — R93.7 ABNORMAL X-RAY OF LUMBAR SPINE: ICD-10-CM

## 2024-01-18 DIAGNOSIS — G89.29 CHRONIC LOW BACK PAIN WITHOUT SCIATICA, UNSPECIFIED BACK PAIN LATERALITY: ICD-10-CM

## 2024-01-18 PROCEDURE — 78315 BONE IMAGING 3 PHASE: CPT

## 2024-01-18 PROCEDURE — A9503 TC99M MEDRONATE: HCPCS | Performed by: INTERNAL MEDICINE

## 2024-01-18 PROCEDURE — 78315 BONE IMAGING 3 PHASE: CPT | Performed by: STUDENT IN AN ORGANIZED HEALTH CARE EDUCATION/TRAINING PROGRAM

## 2024-01-18 PROCEDURE — 3430000001 HC RX 343 DIAGNOSTIC RADIOPHARMACEUTICALS: Performed by: INTERNAL MEDICINE

## 2024-01-18 RX ADMIN — TECHNETIUM TC 99M MEDRONATE 26.6 MILLICURIE: 25 INJECTION, POWDER, FOR SOLUTION INTRAVENOUS at 07:42

## 2024-01-18 ASSESSMENT — ENCOUNTER SYMPTOMS
OCCASIONAL FEELINGS OF UNSTEADINESS: 0
DEPRESSION: 1
LOSS OF SENSATION IN FEET: 0

## 2024-01-21 DIAGNOSIS — M13.0 POLYARTHRITIS, UNSPECIFIED: ICD-10-CM

## 2024-01-21 RX ORDER — SPIRONOLACTONE 50 MG/1
50 TABLET, FILM COATED ORAL DAILY
Qty: 90 TABLET | Refills: 3 | Status: SHIPPED | OUTPATIENT
Start: 2024-01-21

## 2024-01-22 ENCOUNTER — HOSPITAL ENCOUNTER (OUTPATIENT)
Dept: RADIOLOGY | Facility: HOSPITAL | Age: 59
Discharge: HOME | End: 2024-01-22
Payer: COMMERCIAL

## 2024-01-22 VITALS — WEIGHT: 199.96 LBS | HEIGHT: 70 IN | BODY MASS INDEX: 28.63 KG/M2

## 2024-01-22 DIAGNOSIS — M89.9 BONE LESION: ICD-10-CM

## 2024-01-22 DIAGNOSIS — R94.8 ABNORMAL RADIONUCLIDE BONE SCAN: ICD-10-CM

## 2024-01-22 DIAGNOSIS — G89.29 CHRONIC LOW BACK PAIN WITHOUT SCIATICA, UNSPECIFIED BACK PAIN LATERALITY: ICD-10-CM

## 2024-01-22 DIAGNOSIS — R93.7 ABNORMAL CT OF SPINE: ICD-10-CM

## 2024-01-22 DIAGNOSIS — M54.50 CHRONIC LOW BACK PAIN WITHOUT SCIATICA, UNSPECIFIED BACK PAIN LATERALITY: ICD-10-CM

## 2024-01-22 PROCEDURE — A9575 INJ GADOTERATE MEGLUMI 0.1ML: HCPCS | Performed by: INTERNAL MEDICINE

## 2024-01-22 PROCEDURE — 2550000001 HC RX 255 CONTRASTS: Performed by: INTERNAL MEDICINE

## 2024-01-22 PROCEDURE — 72158 MRI LUMBAR SPINE W/O & W/DYE: CPT

## 2024-01-22 RX ORDER — GADOTERATE MEGLUMINE 376.9 MG/ML
18 INJECTION INTRAVENOUS
Status: COMPLETED | OUTPATIENT
Start: 2024-01-22 | End: 2024-01-22

## 2024-01-22 RX ORDER — GADOTERATE MEGLUMINE 376.9 MG/ML
18 INJECTION INTRAVENOUS
Status: DISCONTINUED | OUTPATIENT
Start: 2024-01-22 | End: 2024-01-22 | Stop reason: SDUPTHER

## 2024-01-22 RX ADMIN — GADOTERATE MEGLUMINE 18 ML: 376.9 INJECTION INTRAVENOUS at 15:33

## 2024-01-24 ENCOUNTER — APPOINTMENT (OUTPATIENT)
Dept: PHYSICAL THERAPY | Facility: CLINIC | Age: 59
End: 2024-01-24
Payer: COMMERCIAL

## 2024-01-31 ENCOUNTER — APPOINTMENT (OUTPATIENT)
Dept: PHYSICAL THERAPY | Facility: CLINIC | Age: 59
End: 2024-01-31
Payer: COMMERCIAL

## 2024-02-07 ENCOUNTER — APPOINTMENT (OUTPATIENT)
Dept: PHYSICAL THERAPY | Facility: CLINIC | Age: 59
End: 2024-02-07
Payer: COMMERCIAL

## 2024-02-14 ENCOUNTER — APPOINTMENT (OUTPATIENT)
Dept: PHYSICAL THERAPY | Facility: CLINIC | Age: 59
End: 2024-02-14
Payer: COMMERCIAL

## 2024-02-21 ENCOUNTER — APPOINTMENT (OUTPATIENT)
Dept: PHYSICAL THERAPY | Facility: CLINIC | Age: 59
End: 2024-02-21
Payer: COMMERCIAL

## 2024-02-22 ENCOUNTER — OFFICE VISIT (OUTPATIENT)
Dept: NEUROLOGY | Facility: CLINIC | Age: 59
End: 2024-02-22
Payer: COMMERCIAL

## 2024-02-22 VITALS
HEART RATE: 65 BPM | DIASTOLIC BLOOD PRESSURE: 72 MMHG | HEIGHT: 70 IN | WEIGHT: 196 LBS | BODY MASS INDEX: 28.06 KG/M2 | SYSTOLIC BLOOD PRESSURE: 116 MMHG

## 2024-02-22 DIAGNOSIS — R27.0 ATAXIA: Primary | ICD-10-CM

## 2024-02-22 DIAGNOSIS — M51.9 LUMBAR DISC DISEASE: ICD-10-CM

## 2024-02-22 DIAGNOSIS — G25.81 RLS (RESTLESS LEGS SYNDROME): ICD-10-CM

## 2024-02-22 DIAGNOSIS — R25.1 TREMOR: ICD-10-CM

## 2024-02-22 DIAGNOSIS — E10.3293 TYPE 1 DIABETES MELLITUS WITH MILD NONPROLIFERATIVE RETINOPATHY OF BOTH EYES WITHOUT MACULAR EDEMA (MULTI): ICD-10-CM

## 2024-02-22 PROCEDURE — 99205 OFFICE O/P NEW HI 60 MIN: CPT | Performed by: PSYCHIATRY & NEUROLOGY

## 2024-02-22 PROCEDURE — 1036F TOBACCO NON-USER: CPT | Performed by: PSYCHIATRY & NEUROLOGY

## 2024-02-22 PROCEDURE — 3078F DIAST BP <80 MM HG: CPT | Performed by: PSYCHIATRY & NEUROLOGY

## 2024-02-22 PROCEDURE — 3074F SYST BP LT 130 MM HG: CPT | Performed by: PSYCHIATRY & NEUROLOGY

## 2024-02-22 ASSESSMENT — ENCOUNTER SYMPTOMS
TREMORS: 1
DIZZINESS: 1

## 2024-02-22 ASSESSMENT — PATIENT HEALTH QUESTIONNAIRE - PHQ9
2. FEELING DOWN, DEPRESSED OR HOPELESS: SEVERAL DAYS
SUM OF ALL RESPONSES TO PHQ9 QUESTIONS 1 & 2: 2
1. LITTLE INTEREST OR PLEASURE IN DOING THINGS: SEVERAL DAYS
10. IF YOU CHECKED OFF ANY PROBLEMS, HOW DIFFICULT HAVE THESE PROBLEMS MADE IT FOR YOU TO DO YOUR WORK, TAKE CARE OF THINGS AT HOME, OR GET ALONG WITH OTHER PEOPLE: SOMEWHAT DIFFICULT

## 2024-02-22 NOTE — PROGRESS NOTES
Lina Sharma  58 y.o.       SUBJECTIVE    Gait Problem       Lina Sharma is a 58-year-old lady who was seen today for evaluation of her episodic dizziness with feeling unsteady and tremors which are mainly postural and with action.  Today her physical and neurological exam is nonfocal and her gait was normal but she felt lightheaded and dizzy with a tendency to go to the left side.  Rest of her physical and neurological examination was normal.  She had an MRI of the head which was normal in the spine which showed a sclerotic lesion at S1 with some arthritic changes.  She had a lab work done including ceruloplasmin level MARIE TSH CBC B12 which were all within normal limit including vitamin D level.  Based on the history I believe that most likely her ataxia with sensory ataxia related to underlying medical problem.  If she continues any problem may repeat an MRI of the head with and without contrast in next 4 to 6 months.  In the meantime I have advised her to see ear nose and throat specialist to look for any etiology from inner ear and try physical therapy for gait safety and evaluation and may be vestibular rehab.  I have scheduled to come back and see me 1 more time in 4 to 6 months    In the meantime I advised her to have a tight control of her diabetes and have her eye exam done and if need be may do an EMG at a later date.    As you recall, Lina is a 58-year-old young lady has been complaining of unsteady gait for many months.  According to her she has to focus when she is walking or getting up from a sitting to standing position or when she is working out.  She denies having any fall but whenever she is walking she feels lightheaded and dizzy with a tendency to lean on the left side.  No history of difficulty with vision speech swallowing.  She does complain of occasional tremors poorly when she is working on the computer and worse on right than left side which are episodically.    At the time of  my evaluation she is not having any tremors and she feels off balance.    All her previous notes reviewed and she has been having diabetes type 1 since she was 15 years of age.      Due to technical limitations of voice recognition and human error, this note may not accurately reflect the care of the patient.    Review of Systems   Neurological:  Positive for dizziness and tremors.        Patient Active Problem List   Diagnosis    Abnormal screening cardiac CT    Routine adult health maintenance    H/O gastritis    History of colon polyps    H/O fracture of wrist    BMI 29.0-29.9,adult    Allergic rhinitis    Alopecia    Anxiety and depression    Asymptomatic microscopic hematuria    Blood pressure elevated without history of HTN    Gastroesophageal reflux disease    Hiatal hernia    H/O compression fracture of spine    Cyst, ovary, follicular    Disorder of bone density and structure, unspecified    Steatosis of liver    Liver cyst    Hypothyroidism    Juvenile rheumatoid arthritis (CMS/HCC)    Lung nodule    Mixed hyperlipidemia    Obstructive sleep apnea (adult) (pediatric)    Polyarthropathy    Reactive airway disease    Uterine leiomyoma    Vitamin D deficiency    RLS (restless legs syndrome)    Hypercalcemia    Abnormal CK    AC (acromioclavicular) arthritis    Adhesive capsulitis    Chronic cough    Compression fracture of thoracic vertebra (CMS/HCC)    COVID-19 long hauler    Hypothyroidism due to Hashimoto's thyroiditis    Low back pain    Metatarsalgia    Plantar fasciitis    Chronic GERD    Hepatic cyst    Insulin pump status    Type 1 diabetes mellitus with mild nonproliferative diabetic retinopathy and without macular edema (CMS/HCC)    Lumbar disc disease    Lumbar spondylosis    Pelvic mass    Bone lesion    Abnormal CT of the abdomen     Past Medical History:   Diagnosis Date    Abnormal screening cardiac CT     8/22:1. Coronary artery calcium score of 13* (LAD) 2. PARK 83rd percentile** for age,  gender, and race in asymptomatic patients. *Coronary Artery  Agatston Score risk:Very low 1-99 Small hiatal hernia. 1.2 cm hepatic cysts and several too small to characterize liver hypodensities. A 1.7 cm round hypodensity at the dome of the liver laterally does not meet the criteria of a simple cyst.    Abnormal x-ray of lumbar spine 12/06/2023 12/23: Degenerative changes as described. Approximate 4 mm osseous density  at the anterior superior aspect of the L5 vertebral body may  represent fracture fragment of indeterminate age. Correlation with CT  may be considered for further assessment as clinically warranted.      1.5 cm ovoid calcific density overlying the pelvis of uncertain  etiology and significance.  12/27/23 CT abd/pelv: 1. No    H/O abdominal ultrasound     8/22: US abd: Cyst in the superior right hepatic lobe measures 1.8 cm. There is a possible background of hepatic steatosis. No other focal hepatic abnormality was demonstrated    H/O bone density study 12/06/2021 12/21:Lowest T score -1.4. FRAX* 10-year Probability of Fracture Based on femoral neck BMD: DualFemur (Left) Major Osteoporotic Fracture: 11.8% Hip Fracture:                1.1% 12/19: -1.7:  FRACTURE RISK (based on FRAX):                       10-year absolute fracture risk:                            - major osteoporotic fracture = 5.8 %                            - hip fracture = 0.5 %    H/O bone density study 01/04/2024    Lowest T score -2.2. 10-year Fracture Risk: Major Osteoporotic Fracture  15.4% Hip Fracture                        2.3%    H/O bone scan 01/18/2024    1. Increased radiotracer uptake on delayed phase only within S1 vertebral body corresponds to a sclerotic lesion seen on prior CT. 2. Intense radiotracer uptake in the thoracic spine, likely corresponding to degenerative joint disease at endplates of T7/T8/T9 seen on CT of abdomen and pelvis on 12/27/2023.    H/O bone scan 01/18/2024    1. Increased radiotracer  uptake on delayed phase only within S1 vertebral body corresponds to a sclerotic lesion seen on prior CT. 2. Intense radiotracer uptake in the thoracic spine, likely corresponding to degenerative joint disease at endplates of T7/T8/T9 seen on CT of abdomen and pelvis on 12/27/2023.    H/O chest x-ray     3/17:  normal    H/O CT scan of abdomen 12/27/2023    1. No acute abdominal or pelvic pathology. 2. Benign hepatic cysts. 3. Enlarged fibroid uterus, The largest projects exophytically off the anterior lower uterine body measuring 4.1 cm. 4. 1.5 cm sclerotic focus S1; bone island versus sclerotic metastatic focus. Bone scan could be performed for further evaluation to see if this area is hypermetabolic as well as to evaluate for any other bony sites.    H/O CT scan of chest     1/20: IMPRESSION: 1. 6 mm ground-glass nodule within the apical segment of the left lower lobe, stable from 2011, most likely benign given its stability. No additional pulmonary nodules or masses identified. 2. Mild scattered areas of atelectasis in both lungs as above. Lungs are otherwise clear of focal infiltrate. 3. Trace pericardial effusion. 4. Age indeterminate mild compression deformityT8.    H/O echocardiogram     11/11: normal    H/O magnetic resonance imaging of brain and brain stem 08/28/2023    There is no evidence of mass, infarction or hemorrhage.    H/O magnetic resonance imaging of brain and brain stem 08/28/2023    There is no evidence of mass, infarction or hemorrhage    H/O magnetic resonance imaging of lumbar spine 01/23/2024    There is a transitional lumbosacral junction. For counting purposes on this examination it is assumed that the last rib-bearing vertebral body is T12 and there is partial lumbarization of the S1 vertebral body. If intervention is to be performed consider counting from the C2 vertebral body    Within the S1 vertebral body there is a 1.9 cm lesion with mixed signal intensity which is nonspecific.     H/O magnetic resonance imaging of lumbar spine     (cont) Differential includes an atypical hemangioma, a bone island, but other lesions are not excluded. Consider short-term interval f/u to evaluate for stability At L5-S1 there is a left paracentral/subarticular disc extrusion measuring 1.5 cm with impingement on the descending left S1 nerve root. Mild-to-moderate spinal canal stenosis and mild-to-moderate bilat neural foraminal stenosis also    H/O pelvic ultrasound     : IMPRESSION: Endometrium measures up to 11 mm in thickness, as described above. This is abnormal in the postmenopausal patient with uterine bleeding.  Leiomyomatous uterus, as discussed above.  Simple left ovarian cyst measures up to 2.7 cm; surveillance pelvic ultrasound is recommended in 1 year for this finding.    H/O x-ray of lumbar spine 2023.Degenerative changes as described. Approximate 4 mm osseous density at the anterior superior aspect of the L5 vertebral body may represent fracture fragment of indeterminate age. Correlation with CT may be considered for further assessment as clinically warranted.    H/O x-ray of lumbar spine 2023.1.5 cm ovoid calcific density overlying the pelvis of uncertain etiology and significance.     Past Surgical History:   Procedure Laterality Date    BELT ABDOMINOPLASTY  07/10/2018    abdominplasty and breast augmentation.     SECTION, CLASSIC  07/10/2018     Section    COLONOSCOPY  2021: polyp (5 years) 2021: - The examined portion of the ileum was normal.                        - Non-bleeding internal hemorrhoids. 10 y repeat    ESOPHAGOGASTRODUODENOSCOPY  2022:  - Medium-sized hiatal hernia. GE junction is at 36cm                        - Gastritis. Biopsied.                        - Normal examined duodenum. : Medium-sized hiatal hernia. GE junction is at 36cm                        - Gastritis. Biopsied.                        -  "Normal examined duodenum.    POLYSOMNOGRAPHY      2/22 PSG: Moderate PRERNA CPAP rec'd    TONSILLECTOMY  07/10/2018    Tonsillectomy With Adenoidectomy    TUBAL LIGATION  07/10/2018    Tubal Ligation       reports that she has never smoked. She has never used smokeless tobacco. She reports current alcohol use. She reports that she does not use drugs.    /72 (BP Location: Left arm, Patient Position: Sitting)   Pulse 65   Ht 1.778 m (5' 10\")   Wt 88.9 kg (196 lb)   BMI 28.12 kg/m²     OBJECTIVE  Physical Exam/Neurological Exam   Constitutional: General appearance: no acute distress   Auscultation of Heart: Regular rate and rhythm, no murmurs, normal S1 and S2.   Carotid Arteries: Intact without any bruits.   Neck is supple.   No lymph adenopathy.   Peripheral Vascular Exam: Pulses +2 and equal in all extremities. No swelling, varicosities, edema or tenderness to palpations.    Abdomen is soft, nondistended. No organomegaly.  Mental status: The patient was in no distress, alert, interactive and cooperative. Affect is appropriate.   Orientation: oriented to person, oriented to place and oriented to time.   Memory: recent memory intact and remote memory intact.   Attention: normal attention span and normal concentrating ability.   Language: normal comprehension and no difficulty naming common objects.   Fund of knowledge: Patient displays adequate knowledge of current events, adequate fund of knowledge regarding past history and adequate fund of knowledge regarding vocabulary.   Eyes: The ophthalmoscopic examination was normal. The fundi are visualized with normal disc margins and without.  Cranial nerve II: Visual fields full to confrontation.   Cranial nerves III, IV, and VI: Pupils round, equally reactive to light; no ptosis. EOMs intact. No nystagmus.   Cranial Nerve V: Facial sensation intact bilaterally.   Cranial nerve VII: Normal and symmetric facial strength.   Cranial nerve VIII: Hearing is intact " bilaterally to finger rub / whisper.   Cranial nerves IX and X: Palate elevates symmetrically.   Cranial nerve XI: Shoulder shrug and neck rotation strength are intact.   Cranial nerve XII: Tongue midline with normal strength.   Motor: Motor exam was normal. Muscle bulk was normal in both upper and lower extremities. Muscle tone was normal in both upper and lower extremities. Muscle strength was 5/5 throughout. no abnormal or adventitious movements were present.   Deep Tendon Reflexes: left biceps 2+ , right biceps 2+, left triceps 2+, right triceps 2+, left brachioradialis 2+, right brachioradialis 2+, left patella 2+, right patella 2+, left ankle jerk 2+, right ankle jerk 2+   Plantar Reflex: Toes downgoing to plantar stimulation on the left. Toes downgoing to plantar stimulation on the right.   Sensory Exam: Normal to light touch.   Coordination: There is no limb dystaxia and rapid alternating movements are intact.  Gait: Gait is normal without spasticity, ataxia or bradykinesia. Stance is stable with a negative Romberg.      ASSESSMENT/PLAN  Diagnoses and all orders for this visit:  Ataxia  Tremor  RLS (restless legs syndrome)  Lumbar disc disease  Type 1 diabetes mellitus with mild nonproliferative retinopathy of both eyes without macular edema (CMS/HCC)        Rashaad Villalpando MD  2/22/2024  10:44 AM

## 2024-03-07 ENCOUNTER — OFFICE VISIT (OUTPATIENT)
Dept: PRIMARY CARE | Facility: CLINIC | Age: 59
End: 2024-03-07
Payer: COMMERCIAL

## 2024-03-07 VITALS
BODY MASS INDEX: 28.2 KG/M2 | DIASTOLIC BLOOD PRESSURE: 84 MMHG | SYSTOLIC BLOOD PRESSURE: 125 MMHG | HEART RATE: 61 BPM | OXYGEN SATURATION: 96 % | TEMPERATURE: 97.2 F | WEIGHT: 197 LBS | HEIGHT: 70 IN

## 2024-03-07 DIAGNOSIS — R39.9 UTI SYMPTOMS: Primary | ICD-10-CM

## 2024-03-07 DIAGNOSIS — R31.21 ASYMPTOMATIC MICROSCOPIC HEMATURIA: ICD-10-CM

## 2024-03-07 PROBLEM — S22.000A COMPRESSION FRACTURE OF THORACIC VERTEBRA (MULTI): Status: RESOLVED | Noted: 2023-10-04 | Resolved: 2024-03-07

## 2024-03-07 PROBLEM — J45.909 REACTIVE AIRWAY DISEASE (HHS-HCC): Status: RESOLVED | Noted: 2023-07-08 | Resolved: 2024-03-07

## 2024-03-07 LAB
APPEARANCE UR: ABNORMAL
BACTERIA #/AREA URNS AUTO: ABNORMAL /HPF
BILIRUB UR STRIP.AUTO-MCNC: NEGATIVE MG/DL
COLOR UR: YELLOW
GLUCOSE UR STRIP.AUTO-MCNC: NEGATIVE MG/DL
KETONES UR STRIP.AUTO-MCNC: NEGATIVE MG/DL
LEUKOCYTE ESTERASE UR QL STRIP.AUTO: ABNORMAL
MUCOUS THREADS #/AREA URNS AUTO: ABNORMAL /LPF
NITRITE UR QL STRIP.AUTO: POSITIVE
PH UR STRIP.AUTO: 6 [PH]
POC BILIRUBIN, URINE: NEGATIVE
POC BLOOD, URINE: ABNORMAL
POC GLUCOSE, URINE: NEGATIVE MG/DL
POC KETONES, URINE: NEGATIVE MG/DL
POC LEUKOCYTES, URINE: ABNORMAL
POC NITRITE,URINE: POSITIVE
POC PH, URINE: 7 PH
POC PROTEIN, URINE: ABNORMAL MG/DL
POC SPECIFIC GRAVITY, URINE: 1.02
POC UROBILINOGEN, URINE: 0.2 EU/DL
PROT UR STRIP.AUTO-MCNC: ABNORMAL MG/DL
RBC # UR STRIP.AUTO: ABNORMAL /UL
RBC #/AREA URNS AUTO: >20 /HPF
SP GR UR STRIP.AUTO: 1.02
UROBILINOGEN UR STRIP.AUTO-MCNC: <2 MG/DL
WBC #/AREA URNS AUTO: >50 /HPF
WBC CLUMPS #/AREA URNS AUTO: ABNORMAL /HPF

## 2024-03-07 PROCEDURE — 3074F SYST BP LT 130 MM HG: CPT | Performed by: NURSE PRACTITIONER

## 2024-03-07 PROCEDURE — 87186 SC STD MICRODIL/AGAR DIL: CPT

## 2024-03-07 PROCEDURE — 87086 URINE CULTURE/COLONY COUNT: CPT

## 2024-03-07 PROCEDURE — 99213 OFFICE O/P EST LOW 20 MIN: CPT | Performed by: NURSE PRACTITIONER

## 2024-03-07 PROCEDURE — 81003 URINALYSIS AUTO W/O SCOPE: CPT | Performed by: NURSE PRACTITIONER

## 2024-03-07 PROCEDURE — 3079F DIAST BP 80-89 MM HG: CPT | Performed by: NURSE PRACTITIONER

## 2024-03-07 PROCEDURE — 1036F TOBACCO NON-USER: CPT | Performed by: NURSE PRACTITIONER

## 2024-03-07 PROCEDURE — 81001 URINALYSIS AUTO W/SCOPE: CPT

## 2024-03-07 RX ORDER — NITROFURANTOIN 25; 75 MG/1; MG/1
100 CAPSULE ORAL 2 TIMES DAILY
Qty: 14 CAPSULE | Refills: 0 | Status: SHIPPED | OUTPATIENT
Start: 2024-03-07 | End: 2024-03-14

## 2024-03-07 NOTE — PROGRESS NOTES
"Problem List Items Addressed This Visit    None  Visit Diagnoses       UTI symptoms    -  Primary    empiric macrobid given sx   await micro, cx  fu prn    Relevant Medications    nitrofurantoin, macrocrystal-monohydrate, (Macrobid) 100 mg capsule    Other Relevant Orders    POCT UA Automated manually resulted    Urinalysis with Reflex Microscopic    Urine Culture             Subjective   Patient ID: Lina Sharma is a 58 y.o. female who presents for UTI (Pressure and urgency, frequency/Got UA sample ).  UTI       No fever  No hematuria  Not emptying      Review of Systems   All other systems reviewed and are negative.      BP Readings from Last 3 Encounters:   03/07/24 125/84   02/22/24 116/72   01/04/24 130/84      Wt Readings from Last 3 Encounters:   03/07/24 89.4 kg (197 lb)   02/22/24 88.9 kg (196 lb)   01/22/24 90.7 kg (199 lb 15.3 oz)      BMI:   Estimated body mass index is 28.27 kg/m² as calculated from the following:    Height as of this encounter: 1.778 m (5' 10\").    Weight as of this encounter: 89.4 kg (197 lb).    Objective   Physical Exam  Constitutional:       General: She is not in acute distress.  HENT:      Head: Normocephalic and atraumatic.      Nose: Nose normal.      Mouth/Throat:      Mouth: Mucous membranes are moist.   Eyes:      Extraocular Movements: Extraocular movements intact.      Conjunctiva/sclera: Conjunctivae normal.   Cardiovascular:      Rate and Rhythm: Normal rate and regular rhythm.      Pulses: Normal pulses.   Pulmonary:      Effort: Pulmonary effort is normal.      Breath sounds: Normal breath sounds.   Abdominal:      General: Bowel sounds are normal.      Palpations: Abdomen is soft.      Tenderness: There is no right CVA tenderness or left CVA tenderness.      Comments: CGM L,, insulin pump R. Slightly tender over bladder    Musculoskeletal:         General: Normal range of motion.      Cervical back: Normal range of motion and neck supple.   Skin:     General: " Skin is warm and dry.   Neurological:      General: No focal deficit present.      Mental Status: She is alert.   Psychiatric:         Mood and Affect: Mood normal.

## 2024-03-09 LAB — BACTERIA UR CULT: ABNORMAL

## 2024-03-25 ENCOUNTER — LAB (OUTPATIENT)
Dept: LAB | Facility: LAB | Age: 59
End: 2024-03-25
Payer: COMMERCIAL

## 2024-03-25 DIAGNOSIS — M13.0 POLYARTHRITIS, UNSPECIFIED: ICD-10-CM

## 2024-03-25 DIAGNOSIS — R31.21 ASYMPTOMATIC MICROSCOPIC HEMATURIA: ICD-10-CM

## 2024-03-25 DIAGNOSIS — E10.9 TYPE 1 DIABETES MELLITUS WITHOUT COMPLICATION (MULTI): ICD-10-CM

## 2024-03-25 LAB
APPEARANCE UR: CLEAR
BACTERIA #/AREA URNS AUTO: ABNORMAL /HPF
BILIRUB UR STRIP.AUTO-MCNC: NEGATIVE MG/DL
COLOR UR: YELLOW
GLUCOSE UR STRIP.AUTO-MCNC: NEGATIVE MG/DL
KETONES UR STRIP.AUTO-MCNC: NEGATIVE MG/DL
LEUKOCYTE ESTERASE UR QL STRIP.AUTO: ABNORMAL
MUCOUS THREADS #/AREA URNS AUTO: ABNORMAL /LPF
NITRITE UR QL STRIP.AUTO: NEGATIVE
PH UR STRIP.AUTO: 5 [PH]
PROT UR STRIP.AUTO-MCNC: NEGATIVE MG/DL
RBC # UR STRIP.AUTO: NEGATIVE /UL
RBC #/AREA URNS AUTO: ABNORMAL /HPF
SP GR UR STRIP.AUTO: 1.02
SQUAMOUS #/AREA URNS AUTO: ABNORMAL /HPF
UROBILINOGEN UR STRIP.AUTO-MCNC: <2 MG/DL
WBC #/AREA URNS AUTO: ABNORMAL /HPF

## 2024-03-25 PROCEDURE — 81001 URINALYSIS AUTO W/SCOPE: CPT

## 2024-03-25 RX ORDER — INSULIN ASPART 100 [IU]/ML
INJECTION, SOLUTION INTRAVENOUS; SUBCUTANEOUS
Qty: 60 ML | Refills: 0 | Status: SHIPPED | OUTPATIENT
Start: 2024-03-25

## 2024-03-25 RX ORDER — MELOXICAM 15 MG/1
TABLET ORAL
Qty: 90 TABLET | Refills: 3 | Status: SHIPPED | OUTPATIENT
Start: 2024-03-25

## 2024-04-13 ENCOUNTER — LAB (OUTPATIENT)
Dept: LAB | Facility: LAB | Age: 59
End: 2024-04-13
Payer: COMMERCIAL

## 2024-04-13 DIAGNOSIS — E10.3299 TYPE 1 DIABETES MELLITUS WITH MILD NONPROLIFERATIVE RETINOPATHY WITHOUT MACULAR EDEMA, UNSPECIFIED LATERALITY (MULTI): ICD-10-CM

## 2024-04-13 DIAGNOSIS — E03.9 HYPOTHYROIDISM, UNSPECIFIED TYPE: ICD-10-CM

## 2024-04-13 LAB
CHOLEST SERPL-MCNC: 224 MG/DL (ref 0–199)
CHOLESTEROL/HDL RATIO: 3.7
HDLC SERPL-MCNC: 61 MG/DL
LDLC SERPL CALC-MCNC: 143 MG/DL
NON HDL CHOLESTEROL: 163 MG/DL (ref 0–149)
TRIGL SERPL-MCNC: 100 MG/DL (ref 0–149)
VLDL: 20 MG/DL (ref 0–40)

## 2024-04-13 PROCEDURE — 80061 LIPID PANEL: CPT

## 2024-04-13 PROCEDURE — 36415 COLL VENOUS BLD VENIPUNCTURE: CPT

## 2024-04-13 PROCEDURE — 84443 ASSAY THYROID STIM HORMONE: CPT

## 2024-04-15 ENCOUNTER — OFFICE VISIT (OUTPATIENT)
Dept: ENDOCRINOLOGY | Facility: CLINIC | Age: 59
End: 2024-04-15
Payer: COMMERCIAL

## 2024-04-15 VITALS
HEIGHT: 70 IN | SYSTOLIC BLOOD PRESSURE: 136 MMHG | BODY MASS INDEX: 28.35 KG/M2 | WEIGHT: 198 LBS | DIASTOLIC BLOOD PRESSURE: 70 MMHG

## 2024-04-15 DIAGNOSIS — E10.3299 TYPE 1 DIABETES MELLITUS WITH MILD NONPROLIFERATIVE RETINOPATHY WITHOUT MACULAR EDEMA, UNSPECIFIED LATERALITY (MULTI): ICD-10-CM

## 2024-04-15 DIAGNOSIS — E03.9 HYPOTHYROIDISM, UNSPECIFIED TYPE: Primary | ICD-10-CM

## 2024-04-15 DIAGNOSIS — E78.2 MIXED HYPERLIPIDEMIA: ICD-10-CM

## 2024-04-15 LAB
POC FINGERSTICK BLOOD GLUCOSE: 120 MG/DL (ref 70–100)
POC HEMOGLOBIN A1C: 7.2 % (ref 4.2–6.5)
TSH SERPL-ACNC: 3.06 MIU/L (ref 0.44–3.98)

## 2024-04-15 PROCEDURE — 82962 GLUCOSE BLOOD TEST: CPT | Performed by: STUDENT IN AN ORGANIZED HEALTH CARE EDUCATION/TRAINING PROGRAM

## 2024-04-15 PROCEDURE — 83036 HEMOGLOBIN GLYCOSYLATED A1C: CPT | Performed by: STUDENT IN AN ORGANIZED HEALTH CARE EDUCATION/TRAINING PROGRAM

## 2024-04-15 PROCEDURE — 3050F LDL-C >= 130 MG/DL: CPT | Performed by: STUDENT IN AN ORGANIZED HEALTH CARE EDUCATION/TRAINING PROGRAM

## 2024-04-15 PROCEDURE — 99214 OFFICE O/P EST MOD 30 MIN: CPT | Performed by: STUDENT IN AN ORGANIZED HEALTH CARE EDUCATION/TRAINING PROGRAM

## 2024-04-15 PROCEDURE — 95251 CONT GLUC MNTR ANALYSIS I&R: CPT | Performed by: STUDENT IN AN ORGANIZED HEALTH CARE EDUCATION/TRAINING PROGRAM

## 2024-04-15 PROCEDURE — 3078F DIAST BP <80 MM HG: CPT | Performed by: STUDENT IN AN ORGANIZED HEALTH CARE EDUCATION/TRAINING PROGRAM

## 2024-04-15 PROCEDURE — 3075F SYST BP GE 130 - 139MM HG: CPT | Performed by: STUDENT IN AN ORGANIZED HEALTH CARE EDUCATION/TRAINING PROGRAM

## 2024-04-15 RX ORDER — EZETIMIBE 10 MG/1
10 TABLET ORAL DAILY
Qty: 90 TABLET | Refills: 1 | Status: SHIPPED | OUTPATIENT
Start: 2024-04-15 | End: 2025-04-15

## 2024-04-15 NOTE — PROGRESS NOTES
"Subjective   Patient ID: Lina Sharma is a 58 y.o. female who presents for Diabetes (Typ1  ,diagnosed 46 years, PCP Dr Bhardwaj, No Family history, No Podiatry, sees an eye doctor, Has Dexcom CGM and tandum pump)   Lab Results   Component Value Date    HGBA1C 7.2 (A) 04/15/2024      HPI  The patient tis a 56 yo female with DM1 diagnosed at age 15 , presents for follow up .   She is on Tandem Controlled IQ , downloads personally reviewed      target 57 % , high 24 % , very high 17 %   Average insulin dose 45 units   Basal 53 % , bolus 47 %   Control IQ 93 %      She previously followed with Paty ROMERO.  current pump settings :  basal :   midnight 0.7 u /hr    8:00 1.2 u/hr    12 pm 0.4 u/hr   6 pm 1.1 u/hr   8 pm 1.2 u/hr  correction factor 1:32  carb ratio 1:7  target 110        previous history :  She was on Medtronic pump years ago and it was stopped at least 7 years ago ( she states at that time she had a abdominoplasty and was working out and pump wouldn't't stay in place.     previous regimen :  Toujeo 36   Fiasp 1:5 carb , with correction 1 unit for every 25 > 150      no known diabetic neuropathy however she takes gabapentin 600 mg at bedtime for restless leg syndrome , nephropathy or retinopathy    Review of Systems    Objective   Physical Exam  Constitutional:       Appearance: Normal appearance.   Cardiovascular:      Rate and Rhythm: Normal rate and regular rhythm.   Pulmonary:      Effort: Pulmonary effort is normal.      Breath sounds: Normal breath sounds.   Neurological:      Mental Status: She is alert.   Psychiatric:         Mood and Affect: Mood normal.      Visit Vitals  /70   Ht 1.778 m (5' 10\")   Wt 89.8 kg (198 lb)   BMI 28.41 kg/m²   Smoking Status Never   BSA 2.11 m²        Assessment/Plan         The patient tis a 59 yo female with DM1 diagnosed at age 15 , presents for follow up . She is on tandem controlled IQ     - Relatively controlled DM1 on insulin pump Tandem " controlled IQ A1c of 7.2 ( was off for 2 months due to programming issues )  continue current setting     - Hypothyroidism euthyroid on LT4 137 mcg 7 pills/ week - TSH added to sample   - Restless leg , ? neuropathy on gabapentin. She  established with neurology   -  Generalized arthritis ,  work up negtaive by rheumatology in past . No improvement in symptoms with jasmine intergrative helath ( acupuncture etc )  - HLD , uncontrolled , off Rosuvastatin 10 mg once a week with pcp , had history of severe muscle cramps on statins in the past she doesn't want to reconsider statins . calcium score is 13 . I will start Ezetmibe      RTC in 3 months

## 2024-04-29 ENCOUNTER — TELEPHONE (OUTPATIENT)
Dept: PRIMARY CARE | Facility: CLINIC | Age: 59
End: 2024-04-29
Payer: COMMERCIAL

## 2024-04-29 DIAGNOSIS — R03.0 BLOOD PRESSURE ELEVATED WITHOUT HISTORY OF HTN: ICD-10-CM

## 2024-04-29 NOTE — TELEPHONE ENCOUNTER
Cmp ordered   Left pt vm that this was ordered for her scan for IV contrast dye to make sure kidney fx is okay

## 2024-04-29 NOTE — TELEPHONE ENCOUNTER
Patient called and stated she received a call about her CT that is scheduled and was advised she needed to complete lab work prior to the scan. Patient got lab work done on 4/15. Is there more lab work that patient needs to complete prior to CT appt? Please advise

## 2024-05-01 ENCOUNTER — LAB (OUTPATIENT)
Dept: LAB | Facility: LAB | Age: 59
End: 2024-05-01
Payer: COMMERCIAL

## 2024-05-01 ENCOUNTER — HOSPITAL ENCOUNTER (OUTPATIENT)
Dept: RADIOLOGY | Facility: CLINIC | Age: 59
End: 2024-05-01
Payer: COMMERCIAL

## 2024-05-01 DIAGNOSIS — R03.0 BLOOD PRESSURE ELEVATED WITHOUT HISTORY OF HTN: ICD-10-CM

## 2024-05-01 DIAGNOSIS — M54.50 CHRONIC BILATERAL LOW BACK PAIN WITHOUT SCIATICA: ICD-10-CM

## 2024-05-01 DIAGNOSIS — R74.8 ALKALINE PHOSPHATASE ELEVATION: Primary | ICD-10-CM

## 2024-05-01 DIAGNOSIS — R93.7 ABNORMAL X-RAY OF LUMBAR SPINE: ICD-10-CM

## 2024-05-01 DIAGNOSIS — G89.29 CHRONIC BILATERAL LOW BACK PAIN WITHOUT SCIATICA: ICD-10-CM

## 2024-05-01 LAB
ALBUMIN SERPL BCP-MCNC: 4.1 G/DL (ref 3.4–5)
ALP SERPL-CCNC: 112 U/L (ref 33–110)
ALT SERPL W P-5'-P-CCNC: 18 U/L (ref 7–45)
ANION GAP SERPL CALC-SCNC: 11 MMOL/L (ref 10–20)
AST SERPL W P-5'-P-CCNC: 22 U/L (ref 9–39)
BILIRUB SERPL-MCNC: 0.3 MG/DL (ref 0–1.2)
BUN SERPL-MCNC: 20 MG/DL (ref 6–23)
CALCIUM SERPL-MCNC: 9.4 MG/DL (ref 8.6–10.3)
CHLORIDE SERPL-SCNC: 103 MMOL/L (ref 98–107)
CO2 SERPL-SCNC: 28 MMOL/L (ref 21–32)
CREAT SERPL-MCNC: 0.93 MG/DL (ref 0.5–1.05)
EGFRCR SERPLBLD CKD-EPI 2021: 72 ML/MIN/1.73M*2
GLUCOSE SERPL-MCNC: 197 MG/DL (ref 74–99)
POTASSIUM SERPL-SCNC: 4.4 MMOL/L (ref 3.5–5.3)
PROT SERPL-MCNC: 6.8 G/DL (ref 6.4–8.2)
SODIUM SERPL-SCNC: 138 MMOL/L (ref 136–145)

## 2024-05-01 PROCEDURE — 84080 ASSAY ALKALINE PHOSPHATASES: CPT

## 2024-05-01 PROCEDURE — 36415 COLL VENOUS BLD VENIPUNCTURE: CPT

## 2024-05-01 PROCEDURE — 80053 COMPREHEN METABOLIC PANEL: CPT

## 2024-05-02 PROBLEM — R05.3 CHRONIC COUGH: Status: RESOLVED | Noted: 2023-10-04 | Resolved: 2024-05-02

## 2024-05-02 PROBLEM — G72.0 STATIN MYOPATHY: Status: ACTIVE | Noted: 2024-05-02

## 2024-05-02 PROBLEM — K21.9 CHRONIC GERD: Status: RESOLVED | Noted: 2023-10-04 | Resolved: 2024-05-02

## 2024-05-02 PROBLEM — T46.6X5A STATIN MYOPATHY: Status: ACTIVE | Noted: 2024-05-02

## 2024-05-02 PROBLEM — M77.40 METATARSALGIA: Status: RESOLVED | Noted: 2023-10-04 | Resolved: 2024-05-02

## 2024-05-02 PROBLEM — M54.50 LOW BACK PAIN: Status: RESOLVED | Noted: 2023-10-04 | Resolved: 2024-05-02

## 2024-05-04 LAB
ALP BONE SERPL-CCNC: 73 U/L (ref 0–55)
ALP LIVER SERPL-CCNC: 65 U/L (ref 0–94)
ALP OTHER SERPL-CCNC: 0 U/L
ALP SERPL-CCNC: 138 U/L (ref 40–120)

## 2024-05-06 ENCOUNTER — HOSPITAL ENCOUNTER (OUTPATIENT)
Dept: RADIOLOGY | Facility: CLINIC | Age: 59
Discharge: HOME | End: 2024-05-06
Payer: COMMERCIAL

## 2024-05-06 DIAGNOSIS — R93.7 ABNORMAL MRI, LUMBAR SPINE: ICD-10-CM

## 2024-05-06 DIAGNOSIS — R93.7 ABNORMAL X-RAY OF LUMBAR SPINE: ICD-10-CM

## 2024-05-06 DIAGNOSIS — R93.7 ABNORMAL CT OF SPINE: ICD-10-CM

## 2024-05-06 DIAGNOSIS — R74.8 ALKALINE PHOSPHATASE ELEVATION: Primary | ICD-10-CM

## 2024-05-06 DIAGNOSIS — M89.9 BONE LESION: ICD-10-CM

## 2024-05-06 PROCEDURE — 72131 CT LUMBAR SPINE W/O DYE: CPT

## 2024-05-06 PROCEDURE — 72131 CT LUMBAR SPINE W/O DYE: CPT | Performed by: RADIOLOGY

## 2024-06-07 ENCOUNTER — LAB (OUTPATIENT)
Dept: LAB | Facility: LAB | Age: 59
End: 2024-06-07
Payer: COMMERCIAL

## 2024-06-07 DIAGNOSIS — R74.8 ALKALINE PHOSPHATASE ELEVATION: ICD-10-CM

## 2024-06-07 DIAGNOSIS — E55.9 VITAMIN D DEFICIENCY: ICD-10-CM

## 2024-06-07 DIAGNOSIS — E78.2 MIXED HYPERLIPIDEMIA: ICD-10-CM

## 2024-06-07 DIAGNOSIS — M85.9 DISORDER OF BONE DENSITY AND STRUCTURE, UNSPECIFIED: ICD-10-CM

## 2024-06-07 DIAGNOSIS — Z00.00 ROUTINE ADULT HEALTH MAINTENANCE: ICD-10-CM

## 2024-06-07 LAB
25(OH)D3 SERPL-MCNC: 35 NG/ML (ref 30–100)
BASOPHILS # BLD AUTO: 0.07 X10*3/UL (ref 0–0.1)
BASOPHILS NFR BLD AUTO: 1 %
CHOLEST SERPL-MCNC: 225 MG/DL (ref 0–199)
CHOLESTEROL/HDL RATIO: 3.3
EOSINOPHIL # BLD AUTO: 0.26 X10*3/UL (ref 0–0.7)
EOSINOPHIL NFR BLD AUTO: 3.5 %
ERYTHROCYTE [DISTWIDTH] IN BLOOD BY AUTOMATED COUNT: 13.8 % (ref 11.5–14.5)
HCT VFR BLD AUTO: 46.9 % (ref 36–46)
HDLC SERPL-MCNC: 67.5 MG/DL
HGB BLD-MCNC: 15 G/DL (ref 12–16)
IMM GRANULOCYTES # BLD AUTO: 0.03 X10*3/UL (ref 0–0.7)
IMM GRANULOCYTES NFR BLD AUTO: 0.4 % (ref 0–0.9)
LDLC SERPL CALC-MCNC: 125 MG/DL
LYMPHOCYTES # BLD AUTO: 1.94 X10*3/UL (ref 1.2–4.8)
LYMPHOCYTES NFR BLD AUTO: 26.5 %
MCH RBC QN AUTO: 30.2 PG (ref 26–34)
MCHC RBC AUTO-ENTMCNC: 32 G/DL (ref 32–36)
MCV RBC AUTO: 94 FL (ref 80–100)
MONOCYTES # BLD AUTO: 0.47 X10*3/UL (ref 0.1–1)
MONOCYTES NFR BLD AUTO: 6.4 %
NEUTROPHILS # BLD AUTO: 4.56 X10*3/UL (ref 1.2–7.7)
NEUTROPHILS NFR BLD AUTO: 62.2 %
NON HDL CHOLESTEROL: 158 MG/DL (ref 0–149)
NRBC BLD-RTO: 0 /100 WBCS (ref 0–0)
PLATELET # BLD AUTO: 321 X10*3/UL (ref 150–450)
RBC # BLD AUTO: 4.97 X10*6/UL (ref 4–5.2)
TRIGL SERPL-MCNC: 165 MG/DL (ref 0–149)
VLDL: 33 MG/DL (ref 0–40)
WBC # BLD AUTO: 7.3 X10*3/UL (ref 4.4–11.3)

## 2024-06-07 PROCEDURE — 84100 ASSAY OF PHOSPHORUS: CPT

## 2024-06-07 PROCEDURE — 82306 VITAMIN D 25 HYDROXY: CPT

## 2024-06-07 PROCEDURE — 36415 COLL VENOUS BLD VENIPUNCTURE: CPT

## 2024-06-07 PROCEDURE — 83735 ASSAY OF MAGNESIUM: CPT

## 2024-06-07 PROCEDURE — 84165 PROTEIN E-PHORESIS SERUM: CPT

## 2024-06-07 PROCEDURE — 85025 COMPLETE CBC W/AUTO DIFF WBC: CPT

## 2024-06-07 PROCEDURE — 84080 ASSAY ALKALINE PHOSPHATASES: CPT

## 2024-06-07 PROCEDURE — 80061 LIPID PANEL: CPT

## 2024-06-11 ENCOUNTER — APPOINTMENT (OUTPATIENT)
Dept: PRIMARY CARE | Facility: CLINIC | Age: 59
End: 2024-06-11
Payer: COMMERCIAL

## 2024-06-11 VITALS
HEIGHT: 70 IN | WEIGHT: 191 LBS | BODY MASS INDEX: 27.35 KG/M2 | TEMPERATURE: 97.8 F | HEART RATE: 60 BPM | DIASTOLIC BLOOD PRESSURE: 82 MMHG | SYSTOLIC BLOOD PRESSURE: 135 MMHG | OXYGEN SATURATION: 95 %

## 2024-06-11 DIAGNOSIS — G89.29 CHRONIC MIDLINE THORACIC BACK PAIN: ICD-10-CM

## 2024-06-11 DIAGNOSIS — E55.9 VITAMIN D DEFICIENCY: ICD-10-CM

## 2024-06-11 DIAGNOSIS — E10.3299 TYPE 1 DIABETES MELLITUS WITH MILD NONPROLIFERATIVE RETINOPATHY WITHOUT MACULAR EDEMA, UNSPECIFIED LATERALITY (MULTI): ICD-10-CM

## 2024-06-11 DIAGNOSIS — T46.6X5A STATIN MYOPATHY: ICD-10-CM

## 2024-06-11 DIAGNOSIS — E78.2 MIXED HYPERLIPIDEMIA: ICD-10-CM

## 2024-06-11 DIAGNOSIS — F32.A ANXIETY AND DEPRESSION: ICD-10-CM

## 2024-06-11 DIAGNOSIS — M25.50 POLYARTHRALGIA: ICD-10-CM

## 2024-06-11 DIAGNOSIS — M47.816 LUMBAR SPONDYLOSIS: ICD-10-CM

## 2024-06-11 DIAGNOSIS — Z00.00 ROUTINE ADULT HEALTH MAINTENANCE: Primary | ICD-10-CM

## 2024-06-11 DIAGNOSIS — M25.562 LEFT KNEE PAIN, UNSPECIFIED CHRONICITY: ICD-10-CM

## 2024-06-11 DIAGNOSIS — F41.9 ANXIETY AND DEPRESSION: ICD-10-CM

## 2024-06-11 DIAGNOSIS — M89.9 BONE LESION: ICD-10-CM

## 2024-06-11 DIAGNOSIS — E03.9 ACQUIRED HYPOTHYROIDISM: ICD-10-CM

## 2024-06-11 DIAGNOSIS — L65.9 ALOPECIA: ICD-10-CM

## 2024-06-11 DIAGNOSIS — G72.0 STATIN MYOPATHY: ICD-10-CM

## 2024-06-11 DIAGNOSIS — Z79.899 MEDICATION MANAGEMENT: ICD-10-CM

## 2024-06-11 DIAGNOSIS — M53.3 BACK PAIN, SACROILIAC: ICD-10-CM

## 2024-06-11 DIAGNOSIS — Z12.31 ENCOUNTER FOR SCREENING MAMMOGRAM FOR BREAST CANCER: ICD-10-CM

## 2024-06-11 DIAGNOSIS — R41.9 COGNITIVE COMPLAINTS: ICD-10-CM

## 2024-06-11 DIAGNOSIS — G25.81 RLS (RESTLESS LEGS SYNDROME): ICD-10-CM

## 2024-06-11 DIAGNOSIS — M85.9 DISORDER OF BONE DENSITY AND STRUCTURE, UNSPECIFIED: ICD-10-CM

## 2024-06-11 DIAGNOSIS — M13.0 POLYARTHROPATHY: ICD-10-CM

## 2024-06-11 DIAGNOSIS — M54.6 CHRONIC MIDLINE THORACIC BACK PAIN: ICD-10-CM

## 2024-06-11 PROBLEM — M72.2 PLANTAR FASCIITIS: Status: RESOLVED | Noted: 2023-10-04 | Resolved: 2024-06-11

## 2024-06-11 PROBLEM — R93.5 ABNORMAL CT OF THE ABDOMEN: Status: RESOLVED | Noted: 2023-12-28 | Resolved: 2024-06-11

## 2024-06-11 LAB
ALP BONE SERPL-CCNC: 58 U/L (ref 0–55)
ALP LIVER SERPL-CCNC: 68 U/L (ref 0–94)
ALP OTHER SERPL-CCNC: 0 U/L
ALP SERPL-CCNC: 125 U/L (ref 40–120)
MAGNESIUM SERPL-MCNC: 1.89 MG/DL (ref 1.6–2.4)
PHOSPHATE SERPL-MCNC: 4.4 MG/DL (ref 2.5–4.9)
PROT SERPL-MCNC: 6.9 G/DL (ref 6.4–8.2)

## 2024-06-11 PROCEDURE — 3049F LDL-C 100-129 MG/DL: CPT | Performed by: INTERNAL MEDICINE

## 2024-06-11 PROCEDURE — 1036F TOBACCO NON-USER: CPT | Performed by: INTERNAL MEDICINE

## 2024-06-11 PROCEDURE — 3075F SYST BP GE 130 - 139MM HG: CPT | Performed by: INTERNAL MEDICINE

## 2024-06-11 PROCEDURE — 99214 OFFICE O/P EST MOD 30 MIN: CPT | Performed by: INTERNAL MEDICINE

## 2024-06-11 PROCEDURE — 3079F DIAST BP 80-89 MM HG: CPT | Performed by: INTERNAL MEDICINE

## 2024-06-11 PROCEDURE — 99396 PREV VISIT EST AGE 40-64: CPT | Performed by: INTERNAL MEDICINE

## 2024-06-11 RX ORDER — OXYCODONE HYDROCHLORIDE 5 MG/1
5 TABLET ORAL EVERY 4 HOURS PRN
Qty: 42 TABLET | Refills: 0 | Status: SHIPPED | OUTPATIENT
Start: 2024-06-11 | End: 2024-06-18

## 2024-06-11 RX ORDER — PREDNISONE 20 MG/1
40 TABLET ORAL DAILY
Qty: 10 TABLET | Refills: 0 | Status: SHIPPED | OUTPATIENT
Start: 2024-06-11 | End: 2024-06-16

## 2024-06-11 RX ORDER — LIDOCAINE 50 MG/G
1 PATCH CUTANEOUS DAILY
COMMUNITY
Start: 2023-09-27

## 2024-06-11 ASSESSMENT — MINI MENTAL STATE EXAM
SUM ALL MMSE QUESTIONS FOR TOTAL SCORE [OUT OF 30].: 30
PLEASE COPY THIS PICTURE (NOTE ALL 10 ANGLES MUST BE PRESENT AND TWO MUST INTERSECT): 1 CORRECT
SAY: I WOULD LIKE YOU TO REPEAT THIS PHRASE AFTER ME: NO IFS, ANDS, OR BUTS.: 1 CORRECT
HAND THE PERSON A PENCIL AND PAPER. SAY:  WRITE ANY COMPLETE SENTENCE ON THAT PIECE OF PAPER. (NOTE: THE SENTENCE MUST MAKE SENSE.  IGNORE SPELLING ERRORS): 1 CORRECT
SHOW: PENCIL [OBJECT] ASK: WHAT IS THIS CALLED?: 2 CORRECT
SPELL THE WORD WORLD FORWARD AND BACKWARDS OR SERIAL 7S: 5 CORRECT
WHAT STATE, COUNTRY, CITY, HOSPITAL, FLOOR: 5 CORRECT
WHAT IS THE YEAR, SEASON, DATE, DAY, AND MONTH: 5 CORRECT
PLACE DESIGN, ERASER AND PENCIL IN FRONT OF THE PERSON.  SAY:  COPY THIS DESIGN PLEASE.  SHOW: DESIGN. ALLOW: MULTIPLE TRIES. WAIT UNTIL PERSON IS FINISHED AND HANDS IT BACK. SCORE: ONLY FOR DIAGRAM WITH 4-SIDED FIGURE BETWEEN TWO 5-SIDED FIGURES: 1 CORRECT
NAME OR REPEAT 3 OBJECTS - (APPLE, TABLE, PENNY) OR (BALL, TREE, FLAG): 3 CORRECT
SAY:  READ THE WORDS ON THE PAGE AND THEN DO WHAT IT SAYS.  THEN HAND THE PERSON THE SHEET WITH CLOSE YOUR EYES ON IT.  IF THE SUBJECT READS AND DOES NOT CLOSE THEIR EYES, REPEAT UP TO THREE TIMES.  SCORE ONLY IF SUBJECT CLOSES EYES.: 3 CORRECT
RECALL THE 3 OBJECTS FROM ABOVE (APPLE, TABLE, PENNY) OR (BALL, TREE, FLAG): 3 CORRECT

## 2024-06-11 ASSESSMENT — PATIENT HEALTH QUESTIONNAIRE - PHQ9
7. TROUBLE CONCENTRATING ON THINGS, SUCH AS READING THE NEWSPAPER OR WATCHING TELEVISION: MORE THAN HALF THE DAYS
5. POOR APPETITE OR OVEREATING: NEARLY EVERY DAY
SUM OF ALL RESPONSES TO PHQ QUESTIONS 1-9: 13
10. IF YOU CHECKED OFF ANY PROBLEMS, HOW DIFFICULT HAVE THESE PROBLEMS MADE IT FOR YOU TO DO YOUR WORK, TAKE CARE OF THINGS AT HOME, OR GET ALONG WITH OTHER PEOPLE: VERY DIFFICULT
SUM OF ALL RESPONSES TO PHQ9 QUESTIONS 1 AND 2: 4
3. TROUBLE FALLING OR STAYING ASLEEP OR SLEEPING TOO MUCH: NOT AT ALL
2. FEELING DOWN, DEPRESSED OR HOPELESS: NEARLY EVERY DAY
1. LITTLE INTEREST OR PLEASURE IN DOING THINGS: SEVERAL DAYS
4. FEELING TIRED OR HAVING LITTLE ENERGY: MORE THAN HALF THE DAYS
6. FEELING BAD ABOUT YOURSELF - OR THAT YOU ARE A FAILURE OR HAVE LET YOURSELF OR YOUR FAMILY DOWN: SEVERAL DAYS
8. MOVING OR SPEAKING SO SLOWLY THAT OTHER PEOPLE COULD HAVE NOTICED. OR THE OPPOSITE, BEING SO FIGETY OR RESTLESS THAT YOU HAVE BEEN MOVING AROUND A LOT MORE THAN USUAL: SEVERAL DAYS
9. THOUGHTS THAT YOU WOULD BE BETTER OFF DEAD, OR OF HURTING YOURSELF: NOT AT ALL

## 2024-06-11 ASSESSMENT — LIFESTYLE VARIABLES: TOTAL SCORE: 4

## 2024-06-11 NOTE — ASSESSMENT & PLAN NOTE
R/o other causes  (CTD/AI, PMR/FM, DISH, EDS)  ? AE to medication, aldactone?)  Trial Steroid   Pain control with opiate  Rheum consult

## 2024-06-11 NOTE — PROGRESS NOTES
ANNUAL CPE VISIT  Chief Complaint   Patient presents with    Annual Exam     Generalized joint and muscle pain.   Hips, Knees, Legs, CMC joint, back  Started years ago  Progressively worse  Taking meloxicam and gabapentin but it not as effected as it was  C/O Back pain - did PT, spine specialist - pain is coming back.  After she saw PT who said her hip was out of alignment  He adjusted it and back felt great afterwards for 4months, was able to run 5Ks  Sx coming back now in her back  Hasbt tried steroids    Left knee is swollen (and was bigger yesterday) and it hurts  No injury or trauma  Hears ripping sound when walking up/down stairs  Hurts bad worse going up  One step at a time  Exercises at OT, runs and weights    Memory loss is apparent and is very worried about it.  Notices that can't remember things she should know  Driving and forgets where she is going  or how to get there  Started 5 years ago, worse recently  Is on gabapentin that was started 2 years ago  MRI brain 8/23: normal    Bone loss in mouth (jaw) what the dentist said overall bone loss as well according to tests.  Deterioration of the bone    BMD reviewed: LEFT FEMUR -NECK  Bone Mineral Density: 0.738  T-Score -2.2     Dizziness and Balance are better overall since last year  Saw Neuro 2/24 (copied)  neurological exam is nonfocal and her gait was normal but she felt lightheaded and dizzy with a tendency to go to the left side. Rest of her physical and neurological examination was normal. She had an MRI of the head which was normal in the spine which showed a sclerotic lesion at S1 with some arthritic changes. She had a lab work done including ceruloplasmin level MARIE TSH CBC B12 which were all within normal limit including vitamin D level. Based on the history I believe that most likely her ataxia with sensory ataxia related to underlying medical problem. If she continues any problem may repeat an MRI of the head with and without contrast in next 4  to 6 months. In the meantime I have advised her to see ear nose and throat specialist to look for any etiology from inner ear and try physical therapy for gait safety and evaluation and may be vestibular rehab. I have scheduled to come back and see me 1 more time in 4 to 6 months     Saw PM&R (Copied)  The patient's symptoms, clinical exam and imaging studies are suggestive of myofacial pain.  The other possible differential diagnosis(es) include(s):  disc herniation and radiculopathy (L1-S1).     Plan   At this time, I would like to make the following recommendation/plan:  1.  Physical Therapy: starting tomorrow.   2.  Medication: continue with meloxicam 15mg every day, continue with Gabapentin 600mg TID   3.  Injections: unclear injection target at this time   4.  Imaging: Interpreted Xray of lumbar spine from 12/04/23 as L5 on S1 retrolisthesis with disc space narrowing. Multilevel facet arthropathy (L4-S1). If fails conservative tx will order Mri for injectio planning (hopefully therapy will assess in finding pain source as unclear.  5. Pending CT abdomen and pelvis with IV contrast to r/o mass?    Labs reviewed:  Component      Latest Ref Rng 6/7/2024   LEUKOCYTES (10*3/UL) IN BLOOD BY AUTOMATED COUNT, Armenian      4.4 - 11.3 x10*3/uL 7.3    nRBC      0.0 - 0.0 /100 WBCs 0.0    ERYTHROCYTES (10*6/UL) IN BLOOD BY AUTOMATED COUNT, Armenian      4.00 - 5.20 x10*6/uL 4.97    HEMOGLOBIN      12.0 - 16.0 g/dL 15.0    HEMATOCRIT      36.0 - 46.0 % 46.9 (H)    MCV      80 - 100 fL 94    MCH      26.0 - 34.0 pg 30.2    MCHC      32.0 - 36.0 g/dL 32.0    RED CELL DISTRIBUTION WIDTH      11.5 - 14.5 % 13.8    PLATELETS (10*3/UL) IN BLOOD AUTOMATED COUNT, Armenian      150 - 450 x10*3/uL 321    NEUTROPHILS/100 LEUKOCYTES IN BLOOD BY AUTOMATED COUNT, Armenian      40.0 - 80.0 % 62.2    Immature Granulocytes %, Automated      0.0 - 0.9 % 0.4    Lymphocytes %      13.0 - 44.0 % 26.5    Monocytes %      2.0 - 10.0 % 6.4     Eosinophils %      0.0 - 6.0 % 3.5    Basophils %      0.0 - 2.0 % 1.0    NEUTROPHILS (10*3/UL) IN BLOOD BY AUTOMATED COUNT, Lithuanian      1.20 - 7.70 x10*3/uL 4.56    Immature Granulocytes Absolute, Automated      0.00 - 0.70 x10*3/uL 0.03    Lymphocytes Absolute      1.20 - 4.80 x10*3/uL 1.94    Monocytes Absolute      0.10 - 1.00 x10*3/uL 0.47    Eosinophils Absolute      0.00 - 0.70 x10*3/uL 0.26    Basophils Absolute      0.00 - 0.10 x10*3/uL 0.07    CHOLESTEROL      0 - 199 mg/dL 225 (H)    HDL CHOLESTEROL      mg/dL 67.5    Cholesterol/HDL Ratio 3.3    LDL Calculated      <=99 mg/dL 125 (H)    VLDL      0 - 40 mg/dL 33    TRIGLYCERIDES      0 - 149 mg/dL 165 (H)    Non HDL Cholesterol      0 - 149 mg/dL 158 (H)    Liver Fraction:      0 - 94 U/L 68    Bone Fraction:      0 - 55 U/L 58 (H)    Alkaline Phosphatase, Other      U/L 0    Alkaline Phosphatase      40 - 120 U/L 125 (H)    Vitamin D, 25-Hydroxy, Total      30 - 100 ng/mL 35     Started on Zetia after labs in April comparison lipids:  Component      Latest Ref Rng 4/13/2024 6/7/2024   CHOLESTEROL      0 - 199 mg/dL 224 (H)  225 (H)    HDL CHOLESTEROL      mg/dL 61.0  67.5    Cholesterol/HDL Ratio 3.7  3.3    LDL Calculated      <=99 mg/dL 143 (H)  125 (H)    VLDL      0 - 40 mg/dL 20  33    TRIGLYCERIDES      0 - 149 mg/dL 100  165 (H)    Non HDL Cholesterol      0 - 149 mg/dL 163 (H)  158 (H)       Legend:  (H) High  Component      Latest Ref Rng 5/1/2024   GLUCOSE      74 - 99 mg/dL 197 (H)    SODIUM      136 - 145 mmol/L 138    POTASSIUM      3.5 - 5.3 mmol/L 4.4    CHLORIDE      98 - 107 mmol/L 103    Bicarbonate      21 - 32 mmol/L 28    Anion Gap      10 - 20 mmol/L 11    Blood Urea Nitrogen      6 - 23 mg/dL 20    Creatinine      0.50 - 1.05 mg/dL 0.93    EGFR      >60 mL/min/1.73m*2 72    Calcium      8.6 - 10.3 mg/dL 9.4    Albumin      3.4 - 5.0 g/dL 4.1    Alkaline Phosphatase      33 - 110 U/L 112 (H)    Total Protein      6.4 - 8.2  g/dL 6.8    AST      9 - 39 U/L 22    Bilirubin Total      0.0 - 1.2 mg/dL 0.3    ALT      7 - 45 U/L 18       Component      Latest Ref Rng 4/13/2024   CHOLESTEROL      0 - 199 mg/dL 224 (H)    HDL CHOLESTEROL      mg/dL 61.0    Cholesterol/HDL Ratio 3.7    LDL Calculated      <=99 mg/dL 143 (H)    VLDL      0 - 40 mg/dL 20    TRIGLYCERIDES      0 - 149 mg/dL 100    Non HDL Cholesterol      0 - 149 mg/dL 163 (H)       Component      Latest Ref Rng 4/15/2024   POC HEMOGLOBIN A1c      4.2 - 6.5 % 7.2 !    POC Fingerstick Blood Glucose      70 - 100 mg/dl 120 !       Component      Latest Ref Rng 4/13/2024   POC Fingerstick Blood Glucose      70 - 100 mg/dl    Thyroid Stimulating Hormone      0.44 - 3.98 mIU/L 3.06        Legend:  (H) High  Alkaline Phosphatase  40 - 120 U/L 125 High  138 High    Bone scan 12/23: 1. Increased radiotracer uptake on delayed phase only within S1  vertebral body corresponds to a sclerotic lesion seen on prior CT.  2. Intense radiotracer uptake in the thoracic spine, likely  corresponding to degenerative joint disease at endplates of T7/T8/T9  seen on CT of abdomen and pelvis on 12/27/2023.    MRI lumbar 1/24: Within the S1 vertebral body there is a 1.9 cm lesion with mixed  signal intensity which is nonspecific. Differential considerations  include an atypical hemangioma, a bone island, however other lesions  are not excluded. Consider short-term interval follow-up to evaluate  for stability.  At L5-S1 there is a left paracentral/subarticular disc extrusion  measuring 1.5 cm with impingement on the descending left S1 nerve  root. There is mild-to-moderate spinal canal stenosis and  mild-to-moderate bilateral neural foraminal stenosis at this level.    CT lumbar 5/6/24: Increased density within the S1 vertebral body corresponds to  previously noted signal change on lumbar spine MRI dated 01/22/2024.  Consider osseous hemangioma.      Narrowing and degenerative sclerosis with subarticular  cyst formation  at the L5/S1 interspace most consistent with degenerative change. No  definite evidence of discitis or osteomyelitis.      Transitional anatomy noted with partial lumbarization of the S1  vertebral body.      Last years labs:   Component      Latest Ref Rng 8/7/2023 8/9/2023   Mercury, Urine per Volume      0.0 - 5.0 ug/L  <2.5    Mercury/Creatinine Ratio      0.0 - 20.0 ug/g CRT  Not Applicable    Arsenic Urine - per volume       0.0 - 34.9 ug/L  <10.0    Arsenic Urine - per 24H      0.0 - 49.9 ug/d  Not Applicable    Arsenic Urine - Ratio to CRT      0.0 - 29.9 ug/g CRT  Not Applicable    Lead, Urine per Volume      0.0 - 5.0 ug/L  <5.0    Lead, 24H Ur      0.0 - 8.1 ug/d  Not Applicable    Lead/Creatinine Ratio      0.0 - 5.0 ug/g CRT  Not Applicable    Mercury, Urine per 24 Hr      0.0 - 20.0 ug/d  Not Applicable    Creatinine, Urine - per volume      mg/dL  123    Creatinine, 24 Hour Urine      500 - 1400 mg/d  Not Applicable    Collection period  RANDOM    Parathyroid Hormone, Intact      18.5 - 88.0 pg/mL 32.7     MAGNESIUM      1.60 - 2.40 mg/dL 1.98     Ceruloplasmin      20 - 60 mg/dL 36     MARIE      NEGATIVE  NEGATIVE     C-Reactive Protein      mg/dL 0.57         Component      Latest Ref Rng 1/12/2023 4/25/2023   Thyroid Stimulating Hormone      0.44 - 3.98 mIU/L 4.17 (H)  5.84 (H)    Thyroxine, Free      0.61 - 1.12 ng/dL 1.03  1.03       Component      Latest Ref Rng 7/30/2022   IRON      35 - 150 ug/dL 54    TIBC      240 - 445 ug/dL 317    % Saturation      25 - 45 % 17 (L)    Vitamin B12      211 - 911 pg/mL 586    Citrulline Antibody, IgG      U/ML <1    Sed Rate      0 - 30 mm/h 7    MARIE      NEGATIVE  NEGATIVE       Legend:  (L) Low    Assessment and Plan:  Problem List Items Addressed This Visit          High    Routine adult health maintenance - Primary    Overview     Influenza Vaccine, Split-Non Spec11/2/2011, 9/26/2009, 8/18/21, 10/1/22  Moderna COVID 19 vaccine 2/10/21,  3/10/21, 8/18/21  Pneumovax 7/9/2007  Adacel 7/17  PAP/HPV 4/18  ([facility]indo); PAP 4/5/21 (-), 8/9/23 (-)- sees GYN  Cscope 8/17 (7yrs); 11/2021 (12y)  Mamm 7/20/18, 11/5/19; 4/9/21, 8/9/22, 8/15/23  BMD 12/19, 12/1/21, 1/4/24         Current Assessment & Plan     Annual Wellness exam completed   Preventive Health History reviewed  Ordered:   Labs    Mammogram          Relevant Orders    Comprehensive Metabolic Panel    CBC and Auto Differential    Lipid Panel    Urinalysis with Reflex Culture and Microscopic    Referral to Gynecology       Medium    Alopecia    Overview     Managed with Aldactone         Anxiety and depression    Overview     Failed Lexapro and Wellbutrin.   Zoloft caused side effects but helped the most.  Pristiq caused AE (more depressed)  on 40mg Prozac (20mg ineffective)         Current Assessment & Plan     Consider Cymbalta         Bone lesion    Overview     12/27/23 CT abd/pelv: 1.5 cm sclerotic focus S1; bone island versus sclerotic metastatic focus. Bone scan could be performed for further evaluation to see if this area is hypermetabolic as well as to evaluate for any other bony sites.  Bone scan 1/24: 1. Increased radiotracer uptake on delayed phase only within Q0qonmpkrvi body corresponds to a sclerotic lesion seen on prior CT. 2. Intense radiotracer uptake in the thoracic spine, likely corresponding to degenerative joint disease at endplates of T7/T8/T9 seen on CT of abdomen and pelvis on 12/27/2023.  MRI 1/24: S1 vertebral body there is a 1.9 cm lesion with mixed signal intensity which is nonspecific. Differential considerations include an atypical hemangioma, a bone island, however other lesions are not excluded. Consider short-term interval follow-up to evaluate for stability.         Current Assessment & Plan     ? If this needs to be biopsied  Will see what Rheum thinks  XR SIJ         Relevant Orders    Referral to Rheumatology    Disorder of bone density and structure, unspecified     Overview     BMD : Lowest T score -2.2. 10-year   Fracture Risk:   Major Osteoporotic Fracture 15.4%   Hip Fracture 2.3%          Current Assessment & Plan     R/o secondary causes as BMD  significantly in 3 years         Relevant Orders    Procollagen - Miscellaneous Test    Magnesium    Parathyroid Hormone, Intact    Serum Protein Electrophoresis    Urine Protein Electrophoresis    N-Telopeptide, Urine    Calcium, 24 Hour Urine    Phosphorus    Celiac Panel    Referral to Rheumatology    Encounter for screening mammogram for breast cancer    Relevant Orders    BI mammo bilateral screening tomosynthesis    Hypothyroidism    Overview     On Synthyroid  Goal TSH 1-2  Sees Endo          Relevant Orders    TSH with reflex to Free T4 if abnormal    Lumbar spondylosis    Overview     XR : Mild spondylosis.  Facet arthropathy most notably lower lumbar spine.   Marked disc space narrowing L5-S1 with mild retrolisthesis L5 on S1.  Mild disc space narrowing L4-L5.   Mild disc space narrowing at the lower thoracic spine and a few additional lumbar levels.   Approximate 4 mm osseous density at the anterior superior aspect of the L5 vertebral body as shown on the lateral projection, could  represent small fracture fragment of indeterminate age.   1.5 cm ovoid calcific density overlying the pelvis of uncertain etiology and significance  MRI : S1 vertebral body there is a 1.9 cm lesion with mixed  signal intensity which is nonspecific. Differential considerations  include an atypical hemangioma, a bone island, however other lesions  are not excluded. Consider short-term interval follow-up to evaluate  for stability.  At L5-S1 there is a left paracentral/subarticular disc extrusion  measuring 1.5 cm with impingement on the descending left S1 nerve  root. There is mild-to-moderate spinal canal stenosis and  mild-to-moderate bilateral neural foraminal stenosis at this level  On Mobic and Gabapentin          Medication management    Overview     UDS, signed controlled substance contract, and OARRS check all up to date               Relevant Orders    DRUG SCREEN,URINE    Mixed hyperlipidemia    Overview     8/22: Coronary artery calcium score of 13    at the time  LDL goal <70.  Adverse effect with statins, declined  Started zetia 5/24         Polyarthropathy    Overview     Saw Rheum at [facility], dx with OA  Tried Lyrica in past  On NSAID and Gabapentin         Current Assessment & Plan     R/o other causes  (CTD/AI, PMR/FM, DISH, EDS)  ? AE to medication, aldactone?)  Trial Steroid   Pain control with opiate  Rheum consult         Relevant Medications    predniSONE (Deltasone) 20 mg tablet    oxyCODONE (Roxicodone) 5 mg immediate release tablet    Other Relevant Orders    Referral to Rheumatology    RLS (restless legs syndrome)    Overview     On Gabapentin         Statin myopathy    Type 1 diabetes mellitus with mild nonproliferative diabetic retinopathy and without macular edema (Multi)    Overview     Managed by ENDO at [facility]  On insulin pump  Statin intolerant  On Zetia  7/22 Hba1c 7.1%;         Relevant Medications    glucagon (Glucagen) 1 mg injection    Other Relevant Orders    Referral to Clinical Pharmacy    Vitamin D deficiency    Overview     Comment on above: Goal 40-50Off supplement;         Relevant Orders    Vitamin D 25-Hydroxy,Total (for eval of Vitamin D levels)     Other Visit Diagnoses       Polyarthralgia        Relevant Orders    Uric acid    C. Trachomatis / N. Gonorrhoeae, Amplified Detection    Referral to Rheumatology    Back pain, sacroiliac        Relevant Orders    HLAB27 Typing    XR sacroiliac joints 3+ views    Sedimentation Rate    Referral to Rheumatology    Chronic midline thoracic back pain        ? etiology  ? DISH vs PMR vs CTD, EDS  Will check XRAY, additional labs  Consult to Rheum    Relevant Orders    XR thoracic spine complete 4+ views    Cognitive complaints        MRI  "normal 8/24  suspect due to gabapentin    Relevant Orders    Cognitive evaluation (Completed)    Left knee pain, unspecified chronicity        Relevant Orders    Referral to Orthopaedic Surgery            ROS otherwise negative aside from what was mentioned above in HPI.    Vitals  /82   Pulse 60   Temp 36.6 °C (97.8 °F)   Ht 1.778 m (5' 10\")   Wt 86.6 kg (191 lb)   SpO2 95%   BMI 27.41 kg/m²   Body mass index is 27.41 kg/m².  Physical Exam  Gen: Alert, NAD  HEENT:  Normal exam  Neck:  No masses/nodes palpable; Thyroid nontender and without nodules; No DAYNA  Respiratory:  Lungs CTAB  CV: RRR  Neuro:  Gross motor and sensory intact  Skin:  No suspicious lesions present  Breast: No masses or axillary lymphadenopathy  BacK: Tender in the Left SIJ and Left thoracic paraspinal and LQ. Neg SLR but Pain with hip flexion in the left QF  Ext: Crepitus in the left knee and grinding in the right knee    MMMSE: 30/30    Allergies and Medications  Crestor [rosuvastatin], Desvenlafaxine, Pollen extracts, and Sertraline  Current Outpatient Medications   Medication Instructions    albuterol (ProAir HFA) 90 mcg/actuation inhaler     calcium carbonate (Tums) 200 mg calcium chewable tablet 1 tablet, oral, As needed    chlorpheniramine (Chlor-Trimeton) 4 mg tablet 2 tablets, oral, 2 times daily    cholecalciferol (Vitamin D-3) 50 MCG (2000 UT) tablet 1 tablet, oral, Daily    esomeprazole (NEXIUM) 40 mg, oral, Daily    ezetimibe (ZETIA) 10 mg, oral, Daily    ferrous sulfate 325 (65 Fe) MG tablet 65 mg of iron, oral, Daily with breakfast    FLUoxetine (PROZAC) 40 mg, oral, Daily    fluticasone (Flonase) 50 mcg/actuation nasal spray nasal, Daily RT    gabapentin (Neurontin) 300 mg capsule Take 300 mg am, take 300 mg afternoon, and take 600 mg HS    glucagon (GLUCAGEN) 1 mg, intramuscular, Once as needed    insulin aspart (NovoLOG U-100 Insulin aspart) 100 unit/mL injection 70 UNITS DAILY VIA INSULIN PUMP    insulin glargine " (Toujeo SoloStar U-300 Insulin) 300 unit/mL (1.5 mL) injection     L. acidophilus/Bifid. animalis 32 billion cell capsule 1 capsule, oral, Daily    levothyroxine (SYNTHROID, LEVOXYL) 137 mcg, oral, Daily    Lidoderm 5 % patch 1 patch, transdermal, Daily    loratadine (Claritin Liqui-Gel) 10 mg capsule 1 capsule, oral, Daily    meloxicam (Mobic) 15 mg tablet TAKE 1 TABLET EVERY DAY AS NEEDED    OneTouch Ultra Test strip check 4 times a day. 250.01. Insulin pump.    oxyCODONE (ROXICODONE) 5 mg, oral, Every 4 hours PRN    predniSONE (DELTASONE) 40 mg, oral, Daily    spironolactone (ALDACTONE) 50 mg, oral, Daily       Active Problem List  Patient Active Problem List   Diagnosis    Abnormal screening cardiac CT    Routine adult health maintenance    H/O gastritis    History of colon polyps    H/O fracture of wrist    BMI 27.0-27.9,adult    Allergic rhinitis    Alopecia    Anxiety and depression    Asymptomatic microscopic hematuria    Blood pressure elevated without history of HTN    Gastroesophageal reflux disease    Hiatal hernia    H/O compression fracture of spine    Cyst, ovary, follicular    Disorder of bone density and structure, unspecified    Steatosis of liver    Liver cyst    Hypothyroidism    Lung nodule    Mixed hyperlipidemia    Obstructive sleep apnea (adult) (pediatric)    Polyarthropathy    Uterine leiomyoma    Vitamin D deficiency    RLS (restless legs syndrome)    Hypercalcemia    AC (acromioclavicular) arthritis    COVID-19 long hauler    Hypothyroidism due to Hashimoto's thyroiditis    Hepatic cyst    Insulin pump status    Type 1 diabetes mellitus with mild nonproliferative diabetic retinopathy and without macular edema (Multi)    Lumbar disc disease    Lumbar spondylosis    Pelvic mass    Bone lesion    Statin myopathy    Medication management    Encounter for screening mammogram for breast cancer       Comprehensive Medical/Surgical/Social/Family History  Past Medical History:   Diagnosis Date     Abnormal CT of the abdomen 12/28/2023 12/27/23:   1. No acute abdominal or pelvic pathology.  2. Benign hepatic cysts.  3. Enlarged fibroid uterus.  4. 1.5 cm sclerotic focus S1; bone island versus sclerotic metastatic  focus. Bone scan could be performed for further evaluation to see if  this area is hypermetabolic as well as to evaluate for any other bony  sites.    Abnormal screening cardiac CT     8/22:1. Coronary artery calcium score of 13* (LAD) 2. PARK 83rd percentile** for age, gender, and race in asymptomatic patients. *Coronary Artery  Agatston Score risk:Very low 1-99 Small hiatal hernia. 1.2 cm hepatic cysts and several too small to characterize liver hypodensities. A 1.7 cm round hypodensity at the dome of the liver laterally does not meet the criteria of a simple cyst.    Abnormal x-ray of lumbar spine 12/06/2023 12/23: Degenerative changes as described. Approximate 4 mm osseous density  at the anterior superior aspect of the L5 vertebral body may  represent fracture fragment of indeterminate age. Correlation with CT  may be considered for further assessment as clinically warranted.      1.5 cm ovoid calcific density overlying the pelvis of uncertain  etiology and significance.  12/27/23 CT abd/pelv: 1. No    H/O abdominal ultrasound     8/22: US abd: Cyst in the superior right hepatic lobe measures 1.8 cm. There is a possible background of hepatic steatosis. No other focal hepatic abnormality was demonstrated    H/O bone density study 12/06/2021 12/21:Lowest T score -1.4. FRAX* 10-year Probability of Fracture Based on femoral neck BMD: DualFemur (Left) Major Osteoporotic Fracture: 11.8% Hip Fracture:                1.1% 12/19: -1.7:  FRACTURE RISK (based on FRAX):                       10-year absolute fracture risk:                            - major osteoporotic fracture = 5.8 %                            - hip fracture = 0.5 %    H/O bone density study 01/04/2024    Lowest T score -2.2.  "10-year Fracture Risk: Major Osteoporotic Fracture  15.4% Hip Fracture                        2.3%    H/O bone scan 01/18/2024    1. Increased radiotracer uptake on delayed phase only within S1 vertebral body corresponds to a sclerotic lesion seen on prior CT. 2. Intense radiotracer uptake in the thoracic spine, likely corresponding to degenerative joint disease at endplates of T7/T8/T9 seen on CT of abdomen and pelvis on 12/27/2023.    H/O bone scan 01/18/2024    1. Increased radiotracer uptake on delayed phase only within S1 vertebral body corresponds to a sclerotic lesion seen on prior CT. 2. Intense radiotracer uptake in the thoracic spine, likely corresponding to degenerative joint disease at endplates of T7/T8/T9 seen on CT of abdomen and pelvis on 12/27/2023.    H/O chest x-ray     3/17:  normal    H/O CT scan 05/06/2024    CT lumbar\" Increased density within the S1 vertebral body corresponds to previously noted signal change on lumbar spine MRI dated 01/22/2024. Consider osseous hemangioma.    Narrowing and degenerative sclerosis with subarticular cyst formation at the L5/S1 interspace most consistent with degenerative change. No definite evidence of discitis or osteomyelitis.    H/O CT scan of abdomen 12/27/2023    1. No acute abdominal or pelvic pathology. 2. Benign hepatic cysts. 3. Enlarged fibroid uterus, The largest projects exophytically off the anterior lower uterine body measuring 4.1 cm. 4. 1.5 cm sclerotic focus S1; bone island versus sclerotic metastatic focus. Bone scan could be performed for further evaluation to see if this area is hypermetabolic as well as to evaluate for any other bony sites.    H/O CT scan of chest     1/20: IMPRESSION: 1. 6 mm ground-glass nodule within the apical segment of the left lower lobe, stable from 2011, most likely benign given its stability. No additional pulmonary nodules or masses identified. 2. Mild scattered areas of atelectasis in both lungs as above. Lungs " are otherwise clear of focal infiltrate. 3. Trace pericardial effusion. 4. Age indeterminate mild compression deformityT8.    H/O echocardiogram     11/11: normal    H/O magnetic resonance imaging of brain and brain stem 08/28/2023    There is no evidence of mass, infarction or hemorrhage.    H/O magnetic resonance imaging of brain and brain stem 08/28/2023    There is no evidence of mass, infarction or hemorrhage    H/O magnetic resonance imaging of lumbar spine 01/23/2024    There is a transitional lumbosacral junction. For counting purposes on this examination it is assumed that the last rib-bearing vertebral body is T12 and there is partial lumbarization of the S1 vertebral body. If intervention is to be performed consider counting from the C2 vertebral body    Within the S1 vertebral body there is a 1.9 cm lesion with mixed signal intensity which is nonspecific.    H/O magnetic resonance imaging of lumbar spine     (cont) Differential includes an atypical hemangioma, a bone island, but other lesions are not excluded. Consider short-term interval f/u to evaluate for stability At L5-S1 there is a left paracentral/subarticular disc extrusion measuring 1.5 cm with impingement on the descending left S1 nerve root. Mild-to-moderate spinal canal stenosis and mild-to-moderate bilat neural foraminal stenosis also    H/O pelvic ultrasound     11/21: IMPRESSION: Endometrium measures up to 11 mm in thickness, as described above. This is abnormal in the postmenopausal patient with uterine bleeding.  Leiomyomatous uterus, as discussed above.  Simple left ovarian cyst measures up to 2.7 cm; surveillance pelvic ultrasound is recommended in 1 year for this finding.    H/O x-ray of lumbar spine 12/2023 1/2.Degenerative changes as described. Approximate 4 mm osseous density at the anterior superior aspect of the L5 vertebral body may represent fracture fragment of indeterminate age. Correlation with CT may be considered for  further assessment as clinically warranted.    H/O x-ray of lumbar spine 2023.1.5 cm ovoid calcific density overlying the pelvis of uncertain etiology and significance.     Past Surgical History:   Procedure Laterality Date    BELT ABDOMINOPLASTY  07/10/2018    abdominplasty and breast augmentation.     SECTION, CLASSIC  07/10/2018     Section    COLONOSCOPY  2021: polyp (5 years) 2021: - The examined portion of the ileum was normal.                        - Non-bleeding internal hemorrhoids. 10 y repeat    ESOPHAGOGASTRODUODENOSCOPY  2022:  - Medium-sized hiatal hernia. GE junction is at 36cm                        - Gastritis. Biopsied.                        - Normal examined duodenum. : Medium-sized hiatal hernia. GE junction is at 36cm                        - Gastritis. Biopsied.                        - Normal examined duodenum.    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    Is the patient prescribed a combination of a benzodiazepine and opioid? no     The last Urine Drug Screen has been reviewed and results are as expected,  and/or the UDS was ordered today if due.  No results found for this or any previous visit (from the past 11  hour(s)).    Controlled Substance Agreement Date 6/11/24  I have reviewed each line item on the Controlled Substance Agreement including, but not limited to, the benefits, risks, and alternatives to treatment with a Controlled Substance medication(s). The patient has verbalized understanding and signed the agreement.    Opioids:  What is the patient's goal of therapy? Pain relief  Is this being achieved with current treatment? TBD    I have calculated the patient's Morphine Dose Equivalent (MED):   I have considered referral to Pain Management and/or a specialist, and do not feel it is necessary at this time.    I feel that it is clinically indicated to continue this current medication regimen after consideration of alternative therapies, and other non-opioid treatment.    Opioid Risk Screening:  Family History of Substance Abuse  Alcohol: 3 (6/11/2024  5:00 PM)  Illegal Drugs: 0 (6/11/2024  5:00 PM)  Prescription Drugs: 0 (6/11/2024  5:00 PM)    Personal History of Substance Abuse  Alcohol: 0 (6/11/2024  5:00 PM)  Illegal Drugs: 0 (6/11/2024  5:00 PM)  Prescription Drugs: 0 (6/11/2024  5:00 PM)    Patient Age (16-45)  Age (16-45): 0 (6/11/2024  5:00 PM)    History of Preadolescent Sexual Abuse  History of Preadolescent Sexual Abuse: .0 (6/11/2024  5:00 PM)    Psychological Disease  Attention Deficit Disorder, Obsessive Compulsive Disorder, Bipolar, Schizophrenia: 0 (6/11/2024  5:00 PM)  Depression: 1 (6/11/2024  5:00 PM)    Total Score  Total Score: 4 (6/11/2024  5:00 PM)    Total Score Risk Category  TOTAL SCORE CATEGORY: Moderate Risk (4-7) (6/11/2024  5:00 PM)        Pain Assessment:  Analgesia  What was your pain level on average during the past week?: 7  What was your pain level at its worst during the past week?: 9  What percentage of your pain has been relieved during the past week?: 50 %  Is the amount of pain relief you are now obtaining from your current pain relievers enough to make a real difference in  your life?: N    Activities of Daily Living  Physical Functioning: Better  Family Relationships: Same  Social Relationships: Same  Mood: Same  Sleep Patterns: Same    Adverse Events  Is patient experiencing any side effects from current pain relievers?: N

## 2024-06-12 ENCOUNTER — HOSPITAL ENCOUNTER (OUTPATIENT)
Dept: RADIOLOGY | Facility: CLINIC | Age: 59
Discharge: HOME | End: 2024-06-12
Payer: COMMERCIAL

## 2024-06-12 ENCOUNTER — LAB (OUTPATIENT)
Dept: LAB | Facility: LAB | Age: 59
End: 2024-06-12
Payer: COMMERCIAL

## 2024-06-12 DIAGNOSIS — M13.0 POLYARTHROPATHY: ICD-10-CM

## 2024-06-12 DIAGNOSIS — M53.3 BACK PAIN, SACROILIAC: ICD-10-CM

## 2024-06-12 DIAGNOSIS — G89.29 CHRONIC MIDLINE THORACIC BACK PAIN: ICD-10-CM

## 2024-06-12 DIAGNOSIS — Z79.899 MEDICATION MANAGEMENT: ICD-10-CM

## 2024-06-12 DIAGNOSIS — M85.9 DISORDER OF BONE DENSITY AND STRUCTURE, UNSPECIFIED: ICD-10-CM

## 2024-06-12 DIAGNOSIS — M25.50 POLYARTHRALGIA: ICD-10-CM

## 2024-06-12 DIAGNOSIS — M54.6 CHRONIC MIDLINE THORACIC BACK PAIN: ICD-10-CM

## 2024-06-12 LAB
AMPHETAMINES UR QL SCN: NORMAL
BARBITURATES UR QL SCN: NORMAL
BENZODIAZ UR QL SCN: NORMAL
BZE UR QL SCN: NORMAL
CANNABINOIDS UR QL SCN: NORMAL
ERYTHROCYTE [SEDIMENTATION RATE] IN BLOOD BY WESTERGREN METHOD: 13 MM/H (ref 0–30)
FENTANYL+NORFENTANYL UR QL SCN: NORMAL
GLIADIN PEPTIDE IGA SER IA-ACNC: 1.9 U/ML
METHADONE UR QL SCN: NORMAL
OPIATES UR QL SCN: NORMAL
OXYCODONE+OXYMORPHONE UR QL SCN: NORMAL
PCP UR QL SCN: NORMAL
PROT UR-ACNC: 6 MG/DL (ref 5–25)
PTH-INTACT SERPL-MCNC: 27.9 PG/ML (ref 18.5–88)
TTG IGA SER IA-ACNC: <1 U/ML
URATE SERPL-MCNC: 3.3 MG/DL (ref 2.3–6.7)

## 2024-06-12 PROCEDURE — 87591 N.GONORRHOEAE DNA AMP PROB: CPT

## 2024-06-12 PROCEDURE — 36415 COLL VENOUS BLD VENIPUNCTURE: CPT

## 2024-06-12 PROCEDURE — 86038 ANTINUCLEAR ANTIBODIES: CPT

## 2024-06-12 PROCEDURE — 86200 CCP ANTIBODY: CPT

## 2024-06-12 PROCEDURE — 82523 COLLAGEN CROSSLINKS: CPT

## 2024-06-12 PROCEDURE — 83970 ASSAY OF PARATHORMONE: CPT

## 2024-06-12 PROCEDURE — 85652 RBC SED RATE AUTOMATED: CPT

## 2024-06-12 PROCEDURE — 72072 X-RAY EXAM THORAC SPINE 3VWS: CPT | Performed by: RADIOLOGY

## 2024-06-12 PROCEDURE — 72072 X-RAY EXAM THORAC SPINE 3VWS: CPT

## 2024-06-12 PROCEDURE — 87491 CHLMYD TRACH DNA AMP PROBE: CPT

## 2024-06-12 PROCEDURE — 83516 IMMUNOASSAY NONANTIBODY: CPT

## 2024-06-12 PROCEDURE — 84156 ASSAY OF PROTEIN URINE: CPT

## 2024-06-12 PROCEDURE — 86431 RHEUMATOID FACTOR QUANT: CPT

## 2024-06-12 PROCEDURE — 84550 ASSAY OF BLOOD/URIC ACID: CPT

## 2024-06-12 PROCEDURE — 72202 X-RAY EXAM SI JOINTS 3/> VWS: CPT

## 2024-06-12 PROCEDURE — 84166 PROTEIN E-PHORESIS/URINE/CSF: CPT

## 2024-06-12 PROCEDURE — 81381 HLA I TYPING 1 ALLELE HR: CPT

## 2024-06-12 PROCEDURE — 86140 C-REACTIVE PROTEIN: CPT

## 2024-06-12 PROCEDURE — 80307 DRUG TEST PRSMV CHEM ANLYZR: CPT

## 2024-06-13 LAB
ALBUMIN MFR UR ELPH: 27.9 %
ALBUMIN: 4 G/DL (ref 3.4–5)
ALPHA 1 GLOBULIN: 0.2 G/DL (ref 0.2–0.6)
ALPHA 2 GLOBULIN: 0.9 G/DL (ref 0.4–1.1)
ALPHA1 GLOB MFR UR ELPH: 15.3 %
ALPHA2 GLOB MFR UR ELPH: 13.4 %
B-GLOBULIN MFR UR ELPH: 21.1 %
BETA GLOBULIN: 0.7 G/DL (ref 0.5–1.2)
C TRACH RRNA SPEC QL NAA+PROBE: NEGATIVE
GAMMA GLOB MFR UR ELPH: 22.3 %
GAMMA GLOBULIN: 1.1 G/DL (ref 0.5–1.4)
N GONORRHOEA DNA SPEC QL PROBE+SIG AMP: NEGATIVE
PATH REVIEW-SERUM PROTEIN ELECTROPHORESIS: NORMAL
PATH REVIEW-URINE PROTEIN ELECTROPHORESIS: NORMAL
PROTEIN ELECTROPHORESIS COMMENT: NORMAL
URINE ELECTROPHORESIS COMMENT: NORMAL

## 2024-06-14 ENCOUNTER — LAB (OUTPATIENT)
Dept: LAB | Facility: LAB | Age: 59
End: 2024-06-14
Payer: COMMERCIAL

## 2024-06-14 DIAGNOSIS — M85.9 DISORDER OF BONE DENSITY AND STRUCTURE, UNSPECIFIED: ICD-10-CM

## 2024-06-14 PROBLEM — M47.814 THORACIC SPONDYLOSIS: Status: ACTIVE | Noted: 2024-06-14

## 2024-06-14 LAB
CALCIUM (MG/L) IN 24 HOUR URINE: 256 MG/24H (ref 100–300)
CALCIUM 24H UR-MRATE: 16 MG/DL
COLLAGEN NTX/CREAT UR-SRTO: 48
COLLECT DURATION TIME SPEC: 24 HRS
CREAT 24H UR-MCNC: 75.9 MG/DL (ref 20–320)
CREAT 24H UR-MRATE: 1.21 G/24 H (ref 0.67–1.59)
CREAT UR-MCNC: 72 MG/DL
GLIADIN PEPTIDE IGG SER IA-ACNC: <0.56 FLU (ref 0–4.99)
SPECIMEN VOL 24H UR: 1600 ML
TTG IGG SER IA-ACNC: <0.82 FLU (ref 0–4.99)

## 2024-06-14 PROCEDURE — 82570 ASSAY OF URINE CREATININE: CPT

## 2024-06-14 PROCEDURE — 82340 ASSAY OF CALCIUM IN URINE: CPT

## 2024-06-14 PROCEDURE — 81050 URINALYSIS VOLUME MEASURE: CPT

## 2024-06-16 DIAGNOSIS — M13.0 POLYARTHROPATHY: Primary | ICD-10-CM

## 2024-06-16 LAB
CCP IGG SERPL-ACNC: <1 U/ML
CRP SERPL-MCNC: 0.18 MG/DL
RHEUMATOID FACT SER NEPH-ACNC: <10 IU/ML (ref 0–15)

## 2024-06-17 DIAGNOSIS — M13.0 POLYARTHROPATHY: ICD-10-CM

## 2024-06-17 LAB — HLAB27 TYPING: NEGATIVE

## 2024-06-17 RX ORDER — PREDNISONE 5 MG/1
5 TABLET ORAL DAILY
Qty: 7 TABLET | Refills: 0 | Status: SHIPPED | OUTPATIENT
Start: 2024-06-17

## 2024-06-17 RX ORDER — PREDNISONE 20 MG/1
TABLET ORAL DAILY
Qty: 21 TABLET | Refills: 0 | Status: SHIPPED | OUTPATIENT
Start: 2024-06-17 | End: 2024-07-08

## 2024-06-18 ENCOUNTER — APPOINTMENT (OUTPATIENT)
Dept: NEUROLOGY | Facility: CLINIC | Age: 59
End: 2024-06-18
Payer: COMMERCIAL

## 2024-06-18 LAB — ANA SER QL HEP2 SUBST: NEGATIVE

## 2024-06-24 ENCOUNTER — APPOINTMENT (OUTPATIENT)
Dept: ORTHOPEDIC SURGERY | Facility: CLINIC | Age: 59
End: 2024-06-24
Payer: COMMERCIAL

## 2024-07-05 DIAGNOSIS — K21.9 GASTRO-ESOPHAGEAL REFLUX DISEASE WITHOUT ESOPHAGITIS: ICD-10-CM

## 2024-07-05 DIAGNOSIS — M13.0 POLYARTHROPATHY: ICD-10-CM

## 2024-07-05 DIAGNOSIS — E10.9 TYPE 1 DIABETES MELLITUS WITHOUT COMPLICATION (MULTI): ICD-10-CM

## 2024-07-05 LAB — SCAN RESULT: NORMAL

## 2024-07-05 RX ORDER — INSULIN ASPART 100 [IU]/ML
INJECTION, SOLUTION INTRAVENOUS; SUBCUTANEOUS
Qty: 70 ML | Refills: 1 | Status: SHIPPED | OUTPATIENT
Start: 2024-07-05

## 2024-07-05 RX ORDER — ESOMEPRAZOLE MAGNESIUM 40 MG/1
40 CAPSULE, DELAYED RELEASE ORAL DAILY
Qty: 90 CAPSULE | Refills: 3 | Status: SHIPPED | OUTPATIENT
Start: 2024-07-05

## 2024-07-05 RX ORDER — PREDNISONE 20 MG/1
TABLET ORAL
Qty: 21 TABLET | Refills: 0 | Status: SHIPPED | OUTPATIENT
Start: 2024-07-05

## 2024-07-15 ENCOUNTER — OFFICE VISIT (OUTPATIENT)
Dept: PRIMARY CARE | Facility: CLINIC | Age: 59
End: 2024-07-15
Payer: COMMERCIAL

## 2024-07-15 VITALS
BODY MASS INDEX: 28.96 KG/M2 | HEART RATE: 60 BPM | TEMPERATURE: 96.7 F | SYSTOLIC BLOOD PRESSURE: 149 MMHG | OXYGEN SATURATION: 93 % | DIASTOLIC BLOOD PRESSURE: 87 MMHG | WEIGHT: 201.8 LBS

## 2024-07-15 DIAGNOSIS — R39.9 UTI SYMPTOMS: Primary | ICD-10-CM

## 2024-07-15 LAB
AMORPH CRY #/AREA UR COMP ASSIST: ABNORMAL /HPF
APPEARANCE UR: ABNORMAL
BACTERIA #/AREA URNS AUTO: ABNORMAL /HPF
BILIRUB UR STRIP.AUTO-MCNC: NEGATIVE MG/DL
COLOR UR: YELLOW
GLUCOSE UR STRIP.AUTO-MCNC: NORMAL MG/DL
KETONES UR STRIP.AUTO-MCNC: NEGATIVE MG/DL
LEUKOCYTE ESTERASE UR QL STRIP.AUTO: ABNORMAL
NITRITE UR QL STRIP.AUTO: NEGATIVE
PH UR STRIP.AUTO: 6 [PH]
PROT UR STRIP.AUTO-MCNC: ABNORMAL MG/DL
RBC # UR STRIP.AUTO: ABNORMAL /UL
RBC #/AREA URNS AUTO: ABNORMAL /HPF
SP GR UR STRIP.AUTO: 1.02
SQUAMOUS #/AREA URNS AUTO: ABNORMAL /HPF
UROBILINOGEN UR STRIP.AUTO-MCNC: NORMAL MG/DL
WBC #/AREA URNS AUTO: >50 /HPF

## 2024-07-15 PROCEDURE — 81001 URINALYSIS AUTO W/SCOPE: CPT

## 2024-07-15 PROCEDURE — 1036F TOBACCO NON-USER: CPT | Performed by: NURSE PRACTITIONER

## 2024-07-15 PROCEDURE — 3079F DIAST BP 80-89 MM HG: CPT | Performed by: NURSE PRACTITIONER

## 2024-07-15 PROCEDURE — 99213 OFFICE O/P EST LOW 20 MIN: CPT | Performed by: NURSE PRACTITIONER

## 2024-07-15 PROCEDURE — 87086 URINE CULTURE/COLONY COUNT: CPT

## 2024-07-15 PROCEDURE — 87186 SC STD MICRODIL/AGAR DIL: CPT

## 2024-07-15 PROCEDURE — 3061F NEG MICROALBUMINURIA REV: CPT | Performed by: NURSE PRACTITIONER

## 2024-07-15 PROCEDURE — 3077F SYST BP >= 140 MM HG: CPT | Performed by: NURSE PRACTITIONER

## 2024-07-15 PROCEDURE — 3049F LDL-C 100-129 MG/DL: CPT | Performed by: NURSE PRACTITIONER

## 2024-07-15 RX ORDER — NITROFURANTOIN 25; 75 MG/1; MG/1
100 CAPSULE ORAL 2 TIMES DAILY
Qty: 14 CAPSULE | Refills: 0 | Status: SHIPPED | OUTPATIENT
Start: 2024-07-15 | End: 2024-07-22

## 2024-07-15 ASSESSMENT — PATIENT HEALTH QUESTIONNAIRE - PHQ9
SUM OF ALL RESPONSES TO PHQ9 QUESTIONS 1 AND 2: 0
1. LITTLE INTEREST OR PLEASURE IN DOING THINGS: NOT AT ALL
2. FEELING DOWN, DEPRESSED OR HOPELESS: NOT AT ALL

## 2024-07-15 NOTE — PROGRESS NOTES
"Problem List Items Addressed This Visit    None  Visit Diagnoses       UTI symptoms    -  Primary    reviewed 3/24 CX  empiric macrobid  if needing abx switch ok to send to local [facility]  prn fu IO    Relevant Medications    nitrofurantoin, macrocrystal-monohydrate, (Macrobid) 100 mg capsule    Other Relevant Orders    Urinalysis with Reflex Microscopic    Urine Culture             Subjective   Patient ID: Lina Sharma is a 59 y.o. female who presents for UTI (UTI sx started yesterday/Sx pressure burning frequency urgency).  UTI       Chills  No hematuria  Took Azo yesterday  Back is hurting  BG trending up     3/7/24:    >100,000 Escherichia coli Abnormal            Resulting [facility]: [facility]     Susceptibility     Escherichia coli     MICROSCAN    Amoxicillin/Clavulanate Susceptible    Ampicillin Resistant    Ampicillin/Sulbactam Intermediate    Cefazolin Susceptible    Cefazolin (uncomplicated UTIs only) Susceptible    Ciprofloxacin Susceptible    Gentamicin Susceptible    Nitrofurantoin Susceptible    Piperacillin/Tazobactam Susceptible    Trimethoprim/Sulfamethoxazole Resistant            Review of Systems   All other systems reviewed and are negative.      BP Readings from Last 3 Encounters:   07/15/24 149/87   06/11/24 135/82   04/15/24 136/70      Wt Readings from Last 3 Encounters:   07/15/24 91.5 kg (201 lb 12.8 oz)   06/11/24 86.6 kg (191 lb)   04/15/24 89.8 kg (198 lb)      BMI:   Estimated body mass index is 28.96 kg/m² as calculated from the following:    Height as of 6/11/24: 1.778 m (5' 10\").    Weight as of this encounter: 91.5 kg (201 lb 12.8 oz).    Objective   Physical Exam  Constitutional:       General: She is not in acute distress.  HENT:      Head: Normocephalic and atraumatic.      Nose: Nose normal.      Mouth/Throat:      Mouth: Mucous membranes are moist.   Eyes:      Extraocular Movements: Extraocular movements intact.      Conjunctiva/sclera: Conjunctivae normal.   Cardiovascular:      " Rate and Rhythm: Normal rate and regular rhythm.      Pulses: Normal pulses.   Pulmonary:      Effort: Pulmonary effort is normal.      Breath sounds: Normal breath sounds.   Abdominal:      General: Bowel sounds are normal.      Palpations: Abdomen is soft.      Tenderness: There is no right CVA tenderness or left CVA tenderness.   Musculoskeletal:         General: Normal range of motion.      Cervical back: Normal range of motion and neck supple.   Skin:     General: Skin is warm and dry.   Neurological:      General: No focal deficit present.      Mental Status: She is alert.   Psychiatric:         Mood and Affect: Mood normal.

## 2024-07-17 LAB — BACTERIA UR CULT: ABNORMAL

## 2024-07-24 ENCOUNTER — APPOINTMENT (OUTPATIENT)
Dept: PHARMACY | Facility: HOSPITAL | Age: 59
End: 2024-07-24
Payer: COMMERCIAL

## 2024-07-24 DIAGNOSIS — E10.3299 TYPE 1 DIABETES MELLITUS WITH MILD NONPROLIFERATIVE RETINOPATHY WITHOUT MACULAR EDEMA, UNSPECIFIED LATERALITY (MULTI): ICD-10-CM

## 2024-07-24 NOTE — PROGRESS NOTES
Subjective   Patient ID: Lina Sharma is a 59 y.o. female who presents for Med Management and Diabetes.    Referring Provider: Rox Bhardwaj MD    Specialists: Endocrinology (Dr. Smiley - last visit 4/15/24)     Patient presents to Clinical Pharmacy for medication review and addressing questions/concerns. Medications updated in chart as appropriate.    Was recently started on Prednisone for back pain/inflammation which has elevated sugars. Insulin pump has been helping with controlling sugars.    Lifestyle Changes:  Current exercise: running/ jogging and weightlifting during OT  Limited from back pain and swollen knees    Glucose Monitoring Regimen:  Patient is using continuous glucose monitor; Dexcom G7    Any episodes of hypoglycemia? no    Adherence: Good  Affordability/Accessibility  No issues with cost of medications at this time.    Diabetes Pharmacotherapy:    Tandem Insulin Pump + Dexcom CGM   Midnight: 0.7 u /hr    8 AM: 1.2 u/hr    12 PM: 0.4 u/hr   6 PM: 1.1 u/hr   8 PM: 1.2 u/hr  Correction Factor 1:32  Carb Ratio 1:7  Target       Historical Diabetes Pharmacotherapy:  Fiasp    Secondary Prevention  Statin? No - had myalgia, now on Zetia  ACE-I/ARB? No    Health Maintenance:   Foot Exam: not scheduled  Lipid Panel:  mg/dL   Influenza Vaccine: Not done recently  Pneumonia Vaccine: PPSV23 7/9/07    Drug Interactions: No significant drug-drug interactions exist that require change in therapy.    Objective     There were no vitals taken for this visit.     Labs  Lab Results   Component Value Date    BILITOT 0.3 05/01/2024    CALCIUM 9.4 05/01/2024    CO2 28 05/01/2024     05/01/2024    CREATININE 0.93 05/01/2024    GLUCOSE 197 (H) 05/01/2024    ALKPHOS 125 (H) 06/07/2024    K 4.4 05/01/2024    PROT 6.9 06/07/2024     05/01/2024    AST 22 05/01/2024    ALT 18 05/01/2024    BUN 20 05/01/2024    ANIONGAP 11 05/01/2024    MG 1.89 06/07/2024    PHOS 4.4 06/07/2024    ALBUMIN  4.1 05/01/2024    GFRF 76 08/07/2023     Lab Results   Component Value Date    TRIG 165 (H) 06/07/2024    CHOL 225 (H) 06/07/2024    LDLCALC 125 (H) 06/07/2024    HDL 67.5 06/07/2024     Lab Results   Component Value Date    HGBA1C 7.2 (A) 04/15/2024       Current Outpatient Medications on File Prior to Visit   Medication Sig Dispense Refill    chlorpheniramine (Chlor-Trimeton) 4 mg tablet Take 2 tablets (8 mg) by mouth 2 times a day.      cholecalciferol (Vitamin D-3) 50 MCG (2000 UT) tablet Take 1 tablet (2,000 Units) by mouth once daily.      esomeprazole (NexIUM) 40 mg DR capsule TAKE 1 CAPSULE BY MOUTH EVERY DAY 90 capsule 3    ezetimibe (Zetia) 10 mg tablet Take 1 tablet (10 mg) by mouth once daily. 90 tablet 1    ferrous sulfate 325 (65 Fe) MG tablet Take 1 tablet by mouth once daily with breakfast.      FLUoxetine (PROzac) 40 mg capsule TAKE 1 CAPSULE BY MOUTH EVERY DAY 90 capsule 3    gabapentin (Neurontin) 300 mg capsule Take 300 mg am, take 300 mg afternoon, and take 600 mg  capsule 3    glucagon (Glucagen) 1 mg injection Inject 1 mg into the muscle 1 time if needed for low blood sugar - see comments (low blood sugar) for up to 6 doses. 2 each 2    insulin aspart (NovoLOG U-100 Insulin aspart) 100 unit/mL injection USE 70 UNITS DAILY VIA INSULIN PUMP 70 mL 1    insulin glargine (Toujeo SoloStar U-300 Insulin) 300 unit/mL (1.5 mL) injection       L. acidophilus/Bifid. animalis 32 billion cell capsule Take 1 capsule by mouth once daily.      levothyroxine (Synthroid, Levoxyl) 137 mcg tablet TAKE 1 TABLET BY MOUTH EVERY DAY 90 tablet 3    meloxicam (Mobic) 15 mg tablet TAKE 1 TABLET EVERY DAY AS NEEDED 90 tablet 3    OneTouch Ultra Test strip check 4 times a day. 250.01. Insulin pump.      predniSONE (Deltasone) 20 mg tablet 40mg daily for 3 days then 30mg daily for 3 days then 20mg daily for 7 days then 10mg daily til gone 21 tablet 0    spironolactone (Aldactone) 50 mg tablet TAKE 1 TABLET BY MOUTH  EVERY DAY 90 tablet 3    albuterol (ProAir HFA) 90 mcg/actuation inhaler       calcium carbonate (Tums) 200 mg calcium chewable tablet Chew 1 tablet (500 mg) if needed.      fluticasone (Flonase) 50 mcg/actuation nasal spray Administer into affected nostril(s) once daily.      Lidoderm 5 % patch Place 1 patch on the skin once daily.      loratadine (Claritin Liqui-Gel) 10 mg capsule Take 1 capsule by mouth once daily.      predniSONE (Deltasone) 5 mg tablet Take 1 tablet (5 mg) by mouth once daily. After finishing 20mg taper. Start this week of 7/15/24 7 tablet 0    [DISCONTINUED] nitrofurantoin, macrocrystal-monohydrate, (Macrobid) 100 mg capsule Take 1 capsule (100 mg) by mouth 2 times a day for 7 days. 14 capsule 0     No current facility-administered medications on file prior to visit.        Assessment/Plan   Problem List Items Addressed This Visit             ICD-10-CM    Type 1 diabetes mellitus with mild nonproliferative diabetic retinopathy and without macular edema (Multi) E10.3299           ASSESSMENT  Patients diabetes is stable with most recent A1c of 7.2% (goal < 7%). Glycemic control is estimated to still be at goal. Patient's SMBG are stable.   Patient was started on Zetia for cholesterol due to intolerance to statins. LDL has improved since previous, though still elevated (goal <100 mg/dL)  Patient uses Tandem insulin pump for sugar management. Uses Novolog for pump. Has Toujeo pens at home in case of pump failure.   Follows regularly with  Endo; sees Dr. Smiley in October      PATIENT GOALS   Fasting B - 120 mg/dL 2-HR Postprandial BG:   Less than 180 mg/dL A1c:   Less than 7.0 %     RECOMMENDATIONS/PLAN  CONTINUE all medications and pump settings as directed.  CONTINUE Zetia 10 mg daily for cholesterol. Can consider adding additional agents if LDL elevated during next lab work.   Follow up: PRN by telephone. Encouraged patient to reach out with any concerns or questions prior to next  appointment.           Other Patient Discussion:  -Patient notes that Prednisone initially worked the first week to help with back pain; however, while on second week pain has returned. Was also prescribed narcotics to help with pain as well.  Discussed starting narcotics to help with pain as directed by PCP, however patient is currently in [place] for vacation and does not have with her.   Is meeting with Rheumatology when she comes back on 8/13/24-will defer further management to them     Vicky Rashid PharmD  Clinical Ambulatory Care Pharmacist  Ph: [phone]    Continue all meds under the continuation of care with the referring provider and clinical pharmacy team.    Clinical Pharmacist follow up: As needed based on clinical intervention  Type of Encounter:  Telephone    Verbal consent to manage patient's drug therapy was obtained from the patient. They were informed they may decline to participate or withdraw from participation in pharmacy services at any time.

## 2024-07-24 NOTE — ASSESSMENT & PLAN NOTE
ASSESSMENT  Patients diabetes is stable with most recent A1c of 7.2% (goal < 7%). Glycemic control is estimated to still be at goal. Patient's SMBG are stable.   Patient was started on Zetia for cholesterol due to intolerance to statins. LDL has improved since previous, though still elevated (goal <100 mg/dL)  Patient uses Tandem insulin pump for sugar management. Uses Novolog for pump. Has Toujeo pens at home in case of pump failure.   Follows regularly with [facility]; sees Dr. Smiley in October      PATIENT GOALS   Fasting B - 120 mg/dL 2-HR Postprandial BG:   Less than 180 mg/dL A1c:   Less than 7.0 %     RECOMMENDATIONS/PLAN  CONTINUE all medications and pump settings as directed.  CONTINUE Zetia 10 mg daily for cholesterol. Can consider adding additional agents if LDL elevated during next lab work.   Follow up: PRN by telephone. Encouraged patient to reach out with any concerns or questions prior to next appointment.

## 2024-07-30 ENCOUNTER — APPOINTMENT (OUTPATIENT)
Dept: ENDOCRINOLOGY | Facility: CLINIC | Age: 59
End: 2024-07-30
Payer: COMMERCIAL

## 2024-08-11 DIAGNOSIS — E03.9 HYPOTHYROIDISM, UNSPECIFIED: ICD-10-CM

## 2024-08-12 ENCOUNTER — APPOINTMENT (OUTPATIENT)
Dept: ORTHOPEDIC SURGERY | Facility: CLINIC | Age: 59
End: 2024-08-12
Payer: COMMERCIAL

## 2024-08-12 RX ORDER — LEVOTHYROXINE SODIUM 137 UG/1
137 TABLET ORAL DAILY
Qty: 90 TABLET | Refills: 3 | Status: SHIPPED | OUTPATIENT
Start: 2024-08-12

## 2024-08-13 ENCOUNTER — APPOINTMENT (OUTPATIENT)
Dept: RHEUMATOLOGY | Facility: CLINIC | Age: 59
End: 2024-08-13
Payer: COMMERCIAL

## 2024-08-13 ENCOUNTER — APPOINTMENT (OUTPATIENT)
Dept: RADIOLOGY | Facility: CLINIC | Age: 59
End: 2024-08-13
Payer: COMMERCIAL

## 2024-08-19 ENCOUNTER — HOSPITAL ENCOUNTER (OUTPATIENT)
Dept: RADIOLOGY | Facility: CLINIC | Age: 59
Discharge: HOME | End: 2024-08-19
Payer: COMMERCIAL

## 2024-08-19 VITALS — HEIGHT: 70 IN | BODY MASS INDEX: 28.63 KG/M2 | WEIGHT: 200 LBS

## 2024-08-19 DIAGNOSIS — Z12.31 ENCOUNTER FOR SCREENING MAMMOGRAM FOR BREAST CANCER: ICD-10-CM

## 2024-08-19 PROCEDURE — 77067 SCR MAMMO BI INCL CAD: CPT | Performed by: RADIOLOGY

## 2024-08-19 PROCEDURE — 77063 BREAST TOMOSYNTHESIS BI: CPT | Performed by: RADIOLOGY

## 2024-08-19 PROCEDURE — 77067 SCR MAMMO BI INCL CAD: CPT

## 2024-08-20 DIAGNOSIS — E78.2 MIXED HYPERLIPIDEMIA: ICD-10-CM

## 2024-08-21 RX ORDER — EZETIMIBE 10 MG/1
10 TABLET ORAL DAILY
Qty: 90 TABLET | Refills: 0 | Status: SHIPPED | OUTPATIENT
Start: 2024-08-21

## 2024-08-28 ENCOUNTER — APPOINTMENT (OUTPATIENT)
Dept: OBSTETRICS AND GYNECOLOGY | Facility: CLINIC | Age: 59
End: 2024-08-28
Payer: COMMERCIAL

## 2024-08-28 VITALS
BODY MASS INDEX: 28.63 KG/M2 | WEIGHT: 200 LBS | SYSTOLIC BLOOD PRESSURE: 112 MMHG | DIASTOLIC BLOOD PRESSURE: 72 MMHG | HEIGHT: 70 IN

## 2024-08-28 DIAGNOSIS — Z01.419 WELL WOMAN EXAM WITH ROUTINE GYNECOLOGICAL EXAM: Primary | ICD-10-CM

## 2024-08-28 PROCEDURE — 1036F TOBACCO NON-USER: CPT | Performed by: OBSTETRICS & GYNECOLOGY

## 2024-08-28 PROCEDURE — 3061F NEG MICROALBUMINURIA REV: CPT | Performed by: OBSTETRICS & GYNECOLOGY

## 2024-08-28 PROCEDURE — 3049F LDL-C 100-129 MG/DL: CPT | Performed by: OBSTETRICS & GYNECOLOGY

## 2024-08-28 PROCEDURE — 99386 PREV VISIT NEW AGE 40-64: CPT | Performed by: OBSTETRICS & GYNECOLOGY

## 2024-08-28 PROCEDURE — 3008F BODY MASS INDEX DOCD: CPT | Performed by: OBSTETRICS & GYNECOLOGY

## 2024-08-28 PROCEDURE — 3078F DIAST BP <80 MM HG: CPT | Performed by: OBSTETRICS & GYNECOLOGY

## 2024-08-28 PROCEDURE — 3074F SYST BP LT 130 MM HG: CPT | Performed by: OBSTETRICS & GYNECOLOGY

## 2024-08-28 ASSESSMENT — PAIN SCALES - GENERAL: PAINLEVEL: 0-NO PAIN

## 2024-08-28 NOTE — PROGRESS NOTES
"Lina Sharma is a 59 y.o. female who is here for a routine exam. PCP = Rox Bhardwaj MD    HPI: She is establishing a new gyn. Her and Dr. Bhardwaj are working hard on her joint pain and arthritis.  She is menopausal and does have symptoms, however they are tolerable for her right now.    GynHx: she had an abnormal pap in 30's, ablation and D&C  Menses: menopause at age 53  STIs: none  Social History:    Exercise:   Past med hx and past surg hx reviewed and notable for: Type 1 DM, HTN,  hypercholesterolemia    Objective   /72   Ht 1.778 m (5' 10\")   Wt 90.7 kg (200 lb)   LMP  (LMP Unknown)   BMI 28.70 kg/m²    General:   Alert and oriented, in no acute distress   Neck: Supple. No visible thyromegaly.    Breast/Axilla: Normal to palpation bilaterally without masses, skin changes, or nipple discharge.    Abdomen: Soft, non-tender, without masses or organomegaly   Vulva: Normal architecture without erythema, masses, or lesions.    Vagina: Normal mucosa without lesions, masses, or atrophy. No abnormal vaginal discharge.    Cervix: Normal without masses, lesions, or signs of cervicitis.    Uterus: Normal mobile, non-enlarged uterus    Adnexa: Normal without masses or lesions   Pelvic Floor No POP noted. No high tone pelvic floor    Psych Normal affect. Normal mood.      Annual  -Pap and HPV last one done last year will repeat 2026  -Mammogram: through PCP  -Colonoscopy: PCP   -DEXA:  === 01/04/24 ===    DEXA BONE DENSITY    - Impression -  DEXA:  According to World Health Organization criteria,  classification is low bone mass (osteopenia)    Followup recommended in two years or sooner as clinically warranted.    All images and detailed analysis are available on the  Radiology  PACS.    MACRO:  None    Signed by: Ellis Mancia 1/4/2024 10:44 AM  Dictation workstation:   [id number]     No orders of the defined types were placed in this encounter.      Problem List Items Addressed This Visit    None   "     Thank you for coming to your annual exam.

## 2024-09-18 ENCOUNTER — HOSPITAL ENCOUNTER (OUTPATIENT)
Dept: RADIOLOGY | Facility: CLINIC | Age: 59
Discharge: HOME | End: 2024-09-18
Payer: COMMERCIAL

## 2024-09-18 ENCOUNTER — APPOINTMENT (OUTPATIENT)
Dept: RHEUMATOLOGY | Facility: CLINIC | Age: 59
End: 2024-09-18
Payer: COMMERCIAL

## 2024-09-18 ENCOUNTER — LAB (OUTPATIENT)
Dept: LAB | Facility: LAB | Age: 59
End: 2024-09-18
Payer: COMMERCIAL

## 2024-09-18 VITALS
SYSTOLIC BLOOD PRESSURE: 124 MMHG | HEART RATE: 61 BPM | DIASTOLIC BLOOD PRESSURE: 84 MMHG | BODY MASS INDEX: 28.84 KG/M2 | WEIGHT: 201 LBS

## 2024-09-18 DIAGNOSIS — M12.9 ARTHROPATHY: ICD-10-CM

## 2024-09-18 DIAGNOSIS — M85.9 DISORDER OF BONE DENSITY AND STRUCTURE, UNSPECIFIED: ICD-10-CM

## 2024-09-18 DIAGNOSIS — M89.9 BONE LESION: ICD-10-CM

## 2024-09-18 DIAGNOSIS — M25.50 POLYARTHRALGIA: ICD-10-CM

## 2024-09-18 DIAGNOSIS — M81.0 OSTEOPOROSIS, UNSPECIFIED OSTEOPOROSIS TYPE, UNSPECIFIED PATHOLOGICAL FRACTURE PRESENCE: Primary | ICD-10-CM

## 2024-09-18 DIAGNOSIS — M13.0 POLYARTHROPATHY: ICD-10-CM

## 2024-09-18 DIAGNOSIS — M81.0 OSTEOPOROSIS, UNSPECIFIED OSTEOPOROSIS TYPE, UNSPECIFIED PATHOLOGICAL FRACTURE PRESENCE: ICD-10-CM

## 2024-09-18 DIAGNOSIS — M53.3 BACK PAIN, SACROILIAC: ICD-10-CM

## 2024-09-18 LAB
CRP SERPL-MCNC: 0.17 MG/DL
ERYTHROCYTE [SEDIMENTATION RATE] IN BLOOD BY WESTERGREN METHOD: 14 MM/H (ref 0–30)
PROT SERPL-MCNC: 7.3 G/DL (ref 6.4–8.2)
URATE SERPL-MCNC: 4.8 MG/DL (ref 2.3–6.7)

## 2024-09-18 PROCEDURE — 84550 ASSAY OF BLOOD/URIC ACID: CPT

## 2024-09-18 PROCEDURE — 1036F TOBACCO NON-USER: CPT | Performed by: INTERNAL MEDICINE

## 2024-09-18 PROCEDURE — 3061F NEG MICROALBUMINURIA REV: CPT | Performed by: INTERNAL MEDICINE

## 2024-09-18 PROCEDURE — 3074F SYST BP LT 130 MM HG: CPT | Performed by: INTERNAL MEDICINE

## 2024-09-18 PROCEDURE — 84165 PROTEIN E-PHORESIS SERUM: CPT

## 2024-09-18 PROCEDURE — 73562 X-RAY EXAM OF KNEE 3: CPT | Mod: BILATERAL PROCEDURE

## 2024-09-18 PROCEDURE — 36415 COLL VENOUS BLD VENIPUNCTURE: CPT

## 2024-09-18 PROCEDURE — 99204 OFFICE O/P NEW MOD 45 MIN: CPT | Performed by: INTERNAL MEDICINE

## 2024-09-18 PROCEDURE — 3049F LDL-C 100-129 MG/DL: CPT | Performed by: INTERNAL MEDICINE

## 2024-09-18 PROCEDURE — 86334 IMMUNOFIX E-PHORESIS SERUM: CPT

## 2024-09-18 PROCEDURE — 3079F DIAST BP 80-89 MM HG: CPT | Performed by: INTERNAL MEDICINE

## 2024-09-18 PROCEDURE — 85652 RBC SED RATE AUTOMATED: CPT

## 2024-09-18 PROCEDURE — 73562 X-RAY EXAM OF KNEE 3: CPT | Mod: 50

## 2024-09-18 PROCEDURE — 84155 ASSAY OF PROTEIN SERUM: CPT

## 2024-09-18 PROCEDURE — 86140 C-REACTIVE PROTEIN: CPT

## 2024-09-18 PROCEDURE — 73130 X-RAY EXAM OF HAND: CPT | Mod: 50

## 2024-09-18 PROCEDURE — 73130 X-RAY EXAM OF HAND: CPT | Mod: BILATERAL PROCEDURE

## 2024-09-18 RX ORDER — CELECOXIB 200 MG/1
200 CAPSULE ORAL 2 TIMES DAILY
Qty: 30 CAPSULE | Refills: 0 | Status: SHIPPED | OUTPATIENT
Start: 2024-09-18 | End: 2024-10-18

## 2024-09-18 RX ORDER — ALENDRONATE SODIUM 70 MG/1
70 TABLET ORAL
Qty: 4 TABLET | Refills: 1 | Status: SHIPPED | OUTPATIENT
Start: 2024-09-18 | End: 2024-11-17

## 2024-09-18 NOTE — PROGRESS NOTES
Initial Rheumatology Patient Visit    Chief Complaint:  Lina Sharma is a 59 y.o. female presenting today for New Patient Visit.    History of Presenting Problem:   59 y.o. female with history of chronic pain presents for evaluation.  Patient reports she is seen rheumatology in the past with negative workup so far.  She has had an extensive workup by her PCP which include MRI of the lumbar spine x-ray of the pelvic/sacral region, bone nuclear scan and serology workup which essentially had been mostly unremarkable  for underlying inflammatory arthritis.  She reports she has had chronic pain since she was a teenager which started in her knees.  At this time she has diffuse pain in her lower back and hands as well centered around her thumb joints.  She has tried meloxicam in the past which helped 50 to 60% she was recently given prednisone 20 mg by her PCP which she said helped with her pain.     Problem List:   Patient Active Problem List   Diagnosis    Abnormal screening cardiac CT    Routine adult health maintenance    H/O gastritis    History of colon polyps    H/O fracture of wrist    BMI 27.0-27.9,adult    Allergic rhinitis    Alopecia    Anxiety and depression    Asymptomatic microscopic hematuria    Blood pressure elevated without history of HTN    Gastroesophageal reflux disease    Hiatal hernia    H/O compression fracture of spine    Cyst, ovary, follicular    Disorder of bone density and structure, unspecified    Steatosis of liver    Liver cyst    Hypothyroidism    Lung nodule    Mixed hyperlipidemia    Obstructive sleep apnea (adult) (pediatric)    Polyarthropathy    Uterine leiomyoma    Vitamin D deficiency    RLS (restless legs syndrome)    Hypercalcemia    AC (acromioclavicular) arthritis    COVID-19 long hauler    Hypothyroidism due to Hashimoto's thyroiditis    Hepatic cyst    Insulin pump status    Type 1 diabetes mellitus with mild nonproliferative diabetic retinopathy and without macular  edema (Multi)    Lumbar disc disease    Lumbar spondylosis    Pelvic mass    Bone lesion    Statin myopathy    Medication management    Encounter for screening mammogram for breast cancer    Thoracic spondylosis    Well woman exam with routine gynecological exam       Past Medical History:   Past Medical History:   Diagnosis Date    Abnormal screening cardiac CT     8/22:1. Coronary artery calcium score of 13* (LAD) 2. PARK 83rd percentile** for age, gender, and race in asymptomatic patients. *Coronary Artery  Agatston Score risk:Very low 1-99 Small hiatal hernia. 1.2 cm hepatic cysts and several too small to characterize liver hypodensities. A 1.7 cm round hypodensity at the dome of the liver laterally does not meet the criteria of a simple cyst.    Abnormal x-ray of lumbar spine 12/06/2023 12/23: Degenerative changes as described. Approximate 4 mm osseous density  at the anterior superior aspect of the L5 vertebral body may  represent fracture fragment of indeterminate age. Correlation with CT  may be considered for further assessment as clinically warranted.      1.5 cm ovoid calcific density overlying the pelvis of uncertain  etiology and significance.  12/27/23 CT abd/pelv: 1. No    Depression     Disease of thyroid gland 1996    H/O abdominal ultrasound     8/22: US abd: Cyst in the superior right hepatic lobe measures 1.8 cm. There is a possible background of hepatic steatosis. No other focal hepatic abnormality was demonstrated    H/O bone density study 12/06/2021 12/21:Lowest T score -1.4. FRAX* 10-year Probability of Fracture Based on femoral neck BMD: DualFemur (Left) Major Osteoporotic Fracture: 11.8% Hip Fracture:                1.1% 12/19: -1.7:  FRACTURE RISK (based on FRAX):                       10-year absolute fracture risk:                            - major osteoporotic fracture = 5.8 %                            - hip fracture = 0.5 %    H/O bone density study 01/04/2024    Lowest T score  "-2.2. 10-year Fracture Risk: Major Osteoporotic Fracture  15.4% Hip Fracture                        2.3%    H/O bone scan 01/18/2024    1. Increased radiotracer uptake on delayed phase only within S1 vertebral body corresponds to a sclerotic lesion seen on prior CT. 2. Intense radiotracer uptake in the thoracic spine, likely corresponding to degenerative joint disease at endplates of T7/T8/T9 seen on CT of abdomen and pelvis on 12/27/2023.    H/O bone scan 01/18/2024    1. Increased radiotracer uptake on delayed phase only within S1 vertebral body corresponds to a sclerotic lesion seen on prior CT. 2. Intense radiotracer uptake in the thoracic spine, likely corresponding to degenerative joint disease at endplates of T7/T8/T9 seen on CT of abdomen and pelvis on 12/27/2023.    H/O chest x-ray     3/17:  normal    H/O CT scan 05/06/2024    CT lumbar\" Increased density within the S1 vertebral body corresponds to previously noted signal change on lumbar spine MRI dated 01/22/2024. Consider osseous hemangioma.    Narrowing and degenerative sclerosis with subarticular cyst formation at the L5/S1 interspace most consistent with degenerative change. No definite evidence of discitis or osteomyelitis.    H/O CT scan of abdomen 12/27/2023    1. No acute abdominal or pelvic pathology. 2. Benign hepatic cysts. 3. Enlarged fibroid uterus, The largest projects exophytically off the anterior lower uterine body measuring 4.1 cm. 4. 1.5 cm sclerotic focus S1; bone island versus sclerotic metastatic focus. Bone scan could be performed for further evaluation to see if this area is hypermetabolic as well as to evaluate for any other bony sites.    H/O echocardiogram     11/11: normal    H/O magnetic resonance imaging of lumbar spine 01/23/2024    There is a transitional lumbosacral junction. For counting purposes on this examination it is assumed that the last rib-bearing vertebral body is T12 and there is partial lumbarization of the S1 " vertebral body. If intervention is to be performed consider counting from the C2 vertebral body    Within the S1 vertebral body there is a 1.9 cm lesion with mixed signal intensity which is nonspecific.    H/O magnetic resonance imaging of lumbar spine     (cont) Differential includes an atypical hemangioma, a bone island, but other lesions are not excluded. Consider short-term interval f/u to evaluate for stability At L5-S1 there is a left paracentral/subarticular disc extrusion measuring 1.5 cm with impingement on the descending left S1 nerve root. Mild-to-moderate spinal canal stenosis and mild-to-moderate bilat neural foraminal stenosis also    H/O pelvic ultrasound     : IMPRESSION: Endometrium measures up to 11 mm in thickness, as described above. This is abnormal in the postmenopausal patient with uterine bleeding.  Leiomyomatous uterus, as discussed above.  Simple left ovarian cyst measures up to 2.7 cm; surveillance pelvic ultrasound is recommended in 1 year for this finding.       Surgical History:   Past Surgical History:   Procedure Laterality Date    BELT ABDOMINOPLASTY  07/10/2018    abdominplasty and breast augmentation.    BREAST BIOPSY       SECTION, CLASSIC  07/10/2018     Section     SECTION, LOW TRANSVERSE      COLONOSCOPY  2021: polyp (5 years) 2021: - The examined portion of the ileum was normal.                        - Non-bleeding internal hemorrhoids. 10 y repeat    COLPOSCOPY      ENDOMETRIAL ABLATION      ESOPHAGOGASTRODUODENOSCOPY  2022:  - Medium-sized hiatal hernia. GE junction is at 36cm                        - Gastritis. Biopsied.                        - Normal examined duodenum. : Medium-sized hiatal hernia. GE junction is at 36cm                        - Gastritis. Biopsied.                        - Normal examined duodenum.    POLYSOMNOGRAPHY       PSG: Moderate PRERNA CPAP rec'd    MA BREAST AUGMENTATION WITH  IMPLANT      TONSILLECTOMY  07/10/2018    Tonsillectomy With Adenoidectomy    TUBAL LIGATION  07/10/2018    Tubal Ligation        Allergies:   Allergies   Allergen Reactions    Crestor [Rosuvastatin] Myalgia    Desvenlafaxine Unknown    Pollen Extracts Unknown    Sertraline Diarrhea       Medications:   Current Outpatient Medications:     albuterol (ProAir HFA) 90 mcg/actuation inhaler, , Disp: , Rfl:     chlorpheniramine (Chlor-Trimeton) 4 mg tablet, Take 2 tablets (8 mg) by mouth 2 times a day., Disp: , Rfl:     cholecalciferol (Vitamin D-3) 50 MCG (2000 UT) tablet, Take 1 tablet (2,000 Units) by mouth once daily., Disp: , Rfl:     esomeprazole (NexIUM) 40 mg DR capsule, TAKE 1 CAPSULE BY MOUTH EVERY DAY, Disp: 90 capsule, Rfl: 3    ezetimibe (Zetia) 10 mg tablet, TAKE 1 TABLET BY MOUTH EVERY DAY, Disp: 90 tablet, Rfl: 0    ferrous sulfate 325 (65 Fe) MG tablet, Take 1 tablet (325 mg) by mouth once daily with breakfast., Disp: , Rfl:     FLUoxetine (PROzac) 40 mg capsule, TAKE 1 CAPSULE BY MOUTH EVERY DAY, Disp: 90 capsule, Rfl: 3    gabapentin (Neurontin) 300 mg capsule, Take 300 mg am, take 300 mg afternoon, and take 600 mg HS, Disp: 360 capsule, Rfl: 3    glucagon (Glucagen) 1 mg injection, Inject 1 mg into the muscle 1 time if needed for low blood sugar - see comments (low blood sugar) for up to 6 doses., Disp: 2 each, Rfl: 2    insulin aspart (NovoLOG U-100 Insulin aspart) 100 unit/mL injection, USE 70 UNITS DAILY VIA INSULIN PUMP, Disp: 70 mL, Rfl: 1    insulin glargine (Toujeo SoloStar U-300 Insulin) 300 unit/mL (1.5 mL) injection, , Disp: , Rfl:     L. acidophilus/Bifid. animalis 32 billion cell capsule, Take 1 capsule by mouth once daily., Disp: , Rfl:     levothyroxine (Synthroid, Levoxyl) 137 mcg tablet, TAKE 1 TABLET BY MOUTH EVERY DAY, Disp: 90 tablet, Rfl: 3    meloxicam (Mobic) 15 mg tablet, TAKE 1 TABLET EVERY DAY AS NEEDED, Disp: 90 tablet, Rfl: 3    OneTouch Ultra Test strip, check 4 times a day.  250.01. Insulin pump., Disp: , Rfl:     predniSONE (Deltasone) 20 mg tablet, 40mg daily for 3 days then 30mg daily for 3 days then 20mg daily for 7 days then 10mg daily til gone, Disp: 21 tablet, Rfl: 0    spironolactone (Aldactone) 50 mg tablet, TAKE 1 TABLET BY MOUTH EVERY DAY, Disp: 90 tablet, Rfl: 3    Review of Systems:   ROS: Denies Morning stiffness, She report left knee joint swelling, dry eyes and mouth, Hx of chronic sinusitis, Hx of ear inflammation, oral, nasal or genital ulcers, Denies sun sensitivity, Denies Raynaud's phenomenon. Denies Uveitis or recent vision changes. Denies new rashes or Hx of Psoriasis, Denies alopecia,   Denies Chest pain/SOB, cough, Hx of asthma, Denies Fever/chills/sweats, she reports history of fatigue, she does has sleep apnea but does not use her CPAP consistently, Denies weight loss  Denies abdominal pain, New onset Dyspepsia, dysphasia, nausea/vomiting/diarrhea, dark/tarry stools, blood in stools or urine. She report Hx of  Chronic back pain.   Denies Hx of blood clot or miscarriages. Hx of easy bruising or bleeding. She report Headaches for the past month,Denies   numbness and tingling, weakness.  All other systems have been reviewed and are negative for complaint.     Objective   Physical Examination:    Visit Vitals  /84   Pulse 61   Wt 91.2 kg (201 lb)   LMP  (LMP Unknown)   BMI 28.84 kg/m²   OB Status Postmenopausal   Smoking Status Never   BSA 2.12 m²        Gen: NAD, A&O x 3  HEENT: clear sclera and conjunctiva,     Musculoskeletal:   Neck; WNL, full ROM  Shoulder: WNL, full ROM  Elbow:WNL, full ROM, no effusion noted  Wrist and fingers;no active synovitis noted, Full ROM in the Wrist , Good fist and   Knees:  No effusions or crepitation, full ROM.  Hips; WNL, full ROM, Negative Bertin test  Ankle, Feet; WNL, full ROM    Skin: No rashes or lesions seen, no nail changes  Neuro: A&O x3, Normal Gait  Diffuse tenderness in her upper and lower  extremities    Procedures :None    Orders:  No orders of the defined types were placed in this encounter.       Provider Impression:   Assessment/Plan   No diagnosis found.     59 y.o. female with history of chronic pain presents for evaluation.She has had an extensive workup by her PCP which include MRI of the lumbar spine x-ray of the pelvic/sacral region, bone nuclear scan and serology workup which essentially had been mostly unremarkable  for underlying inflammatory arthritis.  Patient likely has underlying chronic pain syndrome/fibromyalgia given the chronicity of her pain symptoms without inflammatory arthritis changes.  Review of her records shows she has history of osteopenia with prior history of wrist fracture and stress fractures  Response to steroids did not indicate or confirm underlying inflammatory arthritis   -Given her history of fracture and T-score of -2.1 in the past despite FRAX score not recommending treatment would recommend treatment for this patient she does qualify for diagnosis of osteoporosis with a history of fracture.  Risk and benefit of treatment was discussed with patient patient is agreeable to try alendronate 70 mg weekly.  -Patient given a different NSAID today, start Celebrex 200 mg twice a day she isto hold meloxicam for the trial.  -Obtain additional serologies  -Obtain screening x-rays  -Follow-up in 4 weeks

## 2024-09-21 LAB
ALBUMIN: 4.4 G/DL (ref 3.4–5)
ALPHA 1 GLOBULIN: 0.2 G/DL (ref 0.2–0.6)
ALPHA 2 GLOBULIN: 0.8 G/DL (ref 0.4–1.1)
BETA GLOBULIN: 0.7 G/DL (ref 0.5–1.2)
GAMMA GLOBULIN: 1.1 G/DL (ref 0.5–1.4)
IMMUNOFIXATION COMMENT: NORMAL
PATH REVIEW - SERUM IMMUNOFIXATION: NORMAL
PATH REVIEW-SERUM PROTEIN ELECTROPHORESIS: NORMAL
PROTEIN ELECTROPHORESIS COMMENT: NORMAL

## 2024-10-20 ENCOUNTER — OFFICE VISIT (OUTPATIENT)
Dept: URGENT CARE | Age: 59
End: 2024-10-20
Payer: COMMERCIAL

## 2024-10-20 ENCOUNTER — ANCILLARY PROCEDURE (OUTPATIENT)
Dept: URGENT CARE | Age: 59
End: 2024-10-20
Payer: COMMERCIAL

## 2024-10-20 VITALS
OXYGEN SATURATION: 97 % | SYSTOLIC BLOOD PRESSURE: 143 MMHG | BODY MASS INDEX: 29.2 KG/M2 | HEART RATE: 79 BPM | WEIGHT: 204 LBS | RESPIRATION RATE: 19 BRPM | TEMPERATURE: 98.6 F | DIASTOLIC BLOOD PRESSURE: 76 MMHG | HEIGHT: 70 IN

## 2024-10-20 DIAGNOSIS — S93.491A SPRAIN OF ANTERIOR TALOFIBULAR LIGAMENT OF RIGHT ANKLE, INITIAL ENCOUNTER: ICD-10-CM

## 2024-10-20 DIAGNOSIS — T14.90XA INJURY: ICD-10-CM

## 2024-10-20 DIAGNOSIS — S92.321A CLOSED DISPLACED FRACTURE OF SECOND METATARSAL BONE OF RIGHT FOOT, INITIAL ENCOUNTER: Primary | ICD-10-CM

## 2024-10-20 DIAGNOSIS — S90.02XA CONTUSION OF LEFT ANKLE, INITIAL ENCOUNTER: ICD-10-CM

## 2024-10-20 PROCEDURE — 73610 X-RAY EXAM OF ANKLE: CPT | Performed by: STUDENT IN AN ORGANIZED HEALTH CARE EDUCATION/TRAINING PROGRAM

## 2024-10-20 PROCEDURE — 3061F NEG MICROALBUMINURIA REV: CPT | Performed by: STUDENT IN AN ORGANIZED HEALTH CARE EDUCATION/TRAINING PROGRAM

## 2024-10-20 PROCEDURE — 3078F DIAST BP <80 MM HG: CPT | Performed by: STUDENT IN AN ORGANIZED HEALTH CARE EDUCATION/TRAINING PROGRAM

## 2024-10-20 PROCEDURE — L1902 AFO ANKLE GAUNTLET PRE OTS: HCPCS | Performed by: STUDENT IN AN ORGANIZED HEALTH CARE EDUCATION/TRAINING PROGRAM

## 2024-10-20 PROCEDURE — 3049F LDL-C 100-129 MG/DL: CPT | Performed by: STUDENT IN AN ORGANIZED HEALTH CARE EDUCATION/TRAINING PROGRAM

## 2024-10-20 PROCEDURE — 99213 OFFICE O/P EST LOW 20 MIN: CPT | Performed by: STUDENT IN AN ORGANIZED HEALTH CARE EDUCATION/TRAINING PROGRAM

## 2024-10-20 PROCEDURE — L4387 NON-PNEUM WALK BOOT PRE OTS: HCPCS | Performed by: STUDENT IN AN ORGANIZED HEALTH CARE EDUCATION/TRAINING PROGRAM

## 2024-10-20 PROCEDURE — 3008F BODY MASS INDEX DOCD: CPT | Performed by: STUDENT IN AN ORGANIZED HEALTH CARE EDUCATION/TRAINING PROGRAM

## 2024-10-20 PROCEDURE — 3077F SYST BP >= 140 MM HG: CPT | Performed by: STUDENT IN AN ORGANIZED HEALTH CARE EDUCATION/TRAINING PROGRAM

## 2024-10-20 ASSESSMENT — PAIN SCALES - GENERAL: PAINLEVEL_OUTOF10: 9

## 2024-10-20 NOTE — LETTER
October 20, 2024     Patient: Lina Sharma   YOB: 1965   Date of Visit: 10/20/2024       To Whom It May Concern:    It is my medical opinion that Lina Sharma may return to work on 10/24/2024 .    If you have any questions or concerns, please don't hesitate to call.         Sincerely,        Jose Carlos Nunez DO    CC: No Recipients

## 2024-10-20 NOTE — PROGRESS NOTES
Subjective   Patient ID: Lina Sharma is a 59 y.o. female. They present today with a chief complaint of fell down (Stairs at the stadium yestarday.) and Ankle Pain (bilateral).    History of Present Illness  Lina is a 59-year-old female with history of fibromyalgia who presents to the urgent care for evaluation of right ankle pain and swelling with bruising after twisting it yesterday while at the stadium.  Patient states she also have left-sided inner ankle pain with some bruising.  Patient notes chronic instability and often rolls her ankles.  Patient denies losing consciousness, falling or hitting her head or any neck pain.  No other symptoms or concerns otherwise reported.    Past Medical History  Allergies as of 10/20/2024 - Reviewed 10/20/2024   Allergen Reaction Noted    Crestor [rosuvastatin] Myalgia 05/02/2024    Desvenlafaxine Unknown 08/07/2023    Pollen extracts Unknown 08/07/2023    Sertraline Diarrhea 08/07/2023       (Not in a hospital admission)       Past Medical History:   Diagnosis Date    Abnormal screening cardiac CT     8/22:1. Coronary artery calcium score of 13* (LAD) 2. PARK 83rd percentile** for age, gender, and race in asymptomatic patients. *Coronary Artery  Agatston Score risk:Very low 1-99 Small hiatal hernia. 1.2 cm hepatic cysts and several too small to characterize liver hypodensities. A 1.7 cm round hypodensity at the dome of the liver laterally does not meet the criteria of a simple cyst.    Abnormal x-ray of lumbar spine 12/06/2023 12/23: Degenerative changes as described. Approximate 4 mm osseous density  at the anterior superior aspect of the L5 vertebral body may  represent fracture fragment of indeterminate age. Correlation with CT  may be considered for further assessment as clinically warranted.      1.5 cm ovoid calcific density overlying the pelvis of uncertain  etiology and significance.  12/27/23 CT abd/pelv: 1. No    Depression     Disease of thyroid gland  "1996    H/O abdominal ultrasound     8/22: US abd: Cyst in the superior right hepatic lobe measures 1.8 cm. There is a possible background of hepatic steatosis. No other focal hepatic abnormality was demonstrated    H/O bone density study 12/06/2021 12/21:Lowest T score -1.4. FRAX* 10-year Probability of Fracture Based on femoral neck BMD: DualFemur (Left) Major Osteoporotic Fracture: 11.8% Hip Fracture:                1.1% 12/19: -1.7:  FRACTURE RISK (based on FRAX):                       10-year absolute fracture risk:                            - major osteoporotic fracture = 5.8 %                            - hip fracture = 0.5 %    H/O bone density study 01/04/2024    Lowest T score -2.2. 10-year Fracture Risk: Major Osteoporotic Fracture  15.4% Hip Fracture                        2.3%    H/O bone scan 01/18/2024    1. Increased radiotracer uptake on delayed phase only within S1 vertebral body corresponds to a sclerotic lesion seen on prior CT. 2. Intense radiotracer uptake in the thoracic spine, likely corresponding to degenerative joint disease at endplates of T7/T8/T9 seen on CT of abdomen and pelvis on 12/27/2023.    H/O bone scan 01/18/2024    1. Increased radiotracer uptake on delayed phase only within S1 vertebral body corresponds to a sclerotic lesion seen on prior CT. 2. Intense radiotracer uptake in the thoracic spine, likely corresponding to degenerative joint disease at endplates of T7/T8/T9 seen on CT of abdomen and pelvis on 12/27/2023.    H/O chest x-ray     3/17:  normal    H/O CT scan 05/06/2024    CT lumbar\" Increased density within the S1 vertebral body corresponds to previously noted signal change on lumbar spine MRI dated 01/22/2024. Consider osseous hemangioma.    Narrowing and degenerative sclerosis with subarticular cyst formation at the L5/S1 interspace most consistent with degenerative change. No definite evidence of discitis or osteomyelitis.    H/O CT scan of abdomen 12/27/2023    " 1. No acute abdominal or pelvic pathology. 2. Benign hepatic cysts. 3. Enlarged fibroid uterus, The largest projects exophytically off the anterior lower uterine body measuring 4.1 cm. 4. 1.5 cm sclerotic focus S1; bone island versus sclerotic metastatic focus. Bone scan could be performed for further evaluation to see if this area is hypermetabolic as well as to evaluate for any other bony sites.    H/O echocardiogram     : normal    H/O magnetic resonance imaging of lumbar spine 2024    There is a transitional lumbosacral junction. For counting purposes on this examination it is assumed that the last rib-bearing vertebral body is T12 and there is partial lumbarization of the S1 vertebral body. If intervention is to be performed consider counting from the C2 vertebral body    Within the S1 vertebral body there is a 1.9 cm lesion with mixed signal intensity which is nonspecific.    H/O magnetic resonance imaging of lumbar spine     (cont) Differential includes an atypical hemangioma, a bone island, but other lesions are not excluded. Consider short-term interval f/u to evaluate for stability At L5-S1 there is a left paracentral/subarticular disc extrusion measuring 1.5 cm with impingement on the descending left S1 nerve root. Mild-to-moderate spinal canal stenosis and mild-to-moderate bilat neural foraminal stenosis also    H/O pelvic ultrasound     : IMPRESSION: Endometrium measures up to 11 mm in thickness, as described above. This is abnormal in the postmenopausal patient with uterine bleeding.  Leiomyomatous uterus, as discussed above.  Simple left ovarian cyst measures up to 2.7 cm; surveillance pelvic ultrasound is recommended in 1 year for this finding.       Past Surgical History:   Procedure Laterality Date    BELT ABDOMINOPLASTY  07/10/2018    abdominplasty and breast augmentation.    BREAST BIOPSY       SECTION, CLASSIC  07/10/2018     Section     SECTION, LOW  "TRANSVERSE      COLONOSCOPY  11/02/2021 8/17: polyp (5 years) 11/2021: - The examined portion of the ileum was normal.                        - Non-bleeding internal hemorrhoids. 10 y repeat    COLPOSCOPY      ENDOMETRIAL ABLATION      ESOPHAGOGASTRODUODENOSCOPY  01/04/2022 1/4/22:  - Medium-sized hiatal hernia. GE junction is at 36cm                        - Gastritis. Biopsied.                        - Normal examined duodenum. 12/21: Medium-sized hiatal hernia. GE junction is at 36cm                        - Gastritis. Biopsied.                        - Normal examined duodenum.    POLYSOMNOGRAPHY      2/22 PSG: Moderate PRERNA CPAP rec'd    LA BREAST AUGMENTATION WITH IMPLANT      TONSILLECTOMY  07/10/2018    Tonsillectomy With Adenoidectomy    TUBAL LIGATION  07/10/2018    Tubal Ligation        reports that she has never smoked. She has never used smokeless tobacco. She reports current alcohol use of about 2.0 standard drinks of alcohol per week. She reports that she does not use drugs.    Review of Systems  A 10-point review of systems was performed, otherwise unremarkable unless stated in the history of present illness.                Objective    Vitals:    10/20/24 1147   BP: 143/76   BP Location: Left arm   Patient Position: Sitting   BP Cuff Size: Adult   Pulse: 79   Resp: 19   Temp: 37 °C (98.6 °F)   TempSrc: Oral   SpO2: 97%   Weight: 92.5 kg (204 lb)   Height: 1.778 m (5' 10\")     No LMP recorded (lmp unknown). Patient is postmenopausal.    Gen: Vitals noted and reviewed, no evidence of acute distress, well developed and afebrile.   Psych: Mood and affect appropriate for setting.  Head/Face: Atraumatic and normocephalic.   Neuro: Sensation intact. No focal deficits noted.  Neck: Full ROM. No meningismus through full range of motion.  Cardiac: Regular rate and rhythm no murmur.   Lungs: Clear to auscultation throughout, No evidence of wheezing, rhonchi or crackles. Good aeration throughout.   MSK b/l " ankle:   Extremities: Symmetrical, No peripheral edema  Skin: Without evidence of ecchymosis, wounds, or rashes.      Point of Care Test & Imaging Results from this visit  No results found for this visit on 10/20/24.   XR ankle right 3+ views    Result Date: 10/20/2024  Interpreted By:  Kenneth Mayes, STUDY: XR ANKLE RIGHT 3+ VIEWS; ;  10/20/2024 12:10 pm   INDICATION: Signs/Symptoms:bilateral ankle injury.   ,T14.90XA Injury, unspecified, initial encounter   COMPARISON: None.   ACCESSION NUMBER(S): [id number]   ORDERING CLINICIAN: JOSE CARLOS BARRAGAN   FINDINGS: Spurring of the plantar calcaneus. No evidence for displaced acute fracture or dislocation seen radiographically. No radiopaque foreign body. No bone lesions or cortical erosions.       No evidence for displaced acute fracture or dislocation of the right ankle identified radiographically.     MACRO: None   Signed by: Kenneth Mayes 10/20/2024 12:55 PM Dictation workstation:   [id number]    XR ankle left 3+ views    Result Date: 10/20/2024  Interpreted By:  Kenneth Mayes, STUDY: XR ANKLE LEFT 3+ VIEWS;; 10/20/2024 12:09 pm   INDICATION: Signs/Symptoms:bilateral ankle injury.   COMPARISON: None.   ACCESSION NUMBER(S): [id number]   ORDERING CLINICIAN: JOSE CARLOS BARRAGAN   FINDINGS: Cortical radiograph clarity involving the base of the 2nd and 3rd metatarsal suggestive of healing Lisfranc fractures. Evaluation limited. No additional fractures or dislocation. Mild spurring of the plantar calcaneus.       Probable healing Lisfranc fractures involving the base of the 2nd and 3rd metatarsals. Recommend clinical correlation. Further evaluation with CT or MRI can be performed as clinically warranted for better assessment. Dedicated radiographs of the left foot also recommended for better assessment.     Signed by: Kenneth Mayes 10/20/2024 12:54 PM Dictation workstation:   [id number]     Diagnostic study results (if any) were reviewed by Jose Carlos Barragan DO.    Assessment/Plan    Allergies, medications, history, and pertinent labs/EKGs/Imaging reviewed by Jose Carlos Nunez DO.     Medical Decision Making  Discussed with the patient symptoms and clinical presentation findings suggestive of a possible healing fracture of the left foot in the setting of a right foot/ankle sprain.  We advise close symptom monitoring supportive treatment.  We provided a lace up ankle brace for the right ankle and advised a left walking boot for possible healing fracture on the left metatarsals.  Stat orthopedic referral to ankle and foot specialist provided in office.  In the meantime we advised activity modifications, limited weightbearing and RICE techniques. Follow up with PCP. We advised seeking immediate emergency medical attention if symptoms fail to improve, worsen or any concerning symptoms arise. Patient voiced full understanding and agreement to plan.      Orders and Diagnoses  Diagnoses and all orders for this visit:  Closed displaced fracture of second metatarsal bone of right foot, initial encounter  -     Referral to Orthopaedic Surgery; Future  Injury  -     XR ankle left 3+ views; Future  -     XR ankle right 3+ views; Future  Sprain of anterior talofibular ligament of right ankle, initial encounter  -     Referral to Podiatry; Future  Contusion of left ankle, initial encounter      Medical Admin Record      Patient disposition: Home    Electronically signed by Jose Carlos Nunez DO  7:54 PM

## 2024-10-20 NOTE — PATIENT INSTRUCTIONS
Follow up with Orthopedics and PCP. We advised seeking immediate emergency medical attention if symptoms fail to improve, worsen or any concerning symptoms arise. Patient voiced full understanding and agreement to plan.

## 2024-10-21 DIAGNOSIS — F32.A DEPRESSION, UNSPECIFIED: ICD-10-CM

## 2024-10-21 DIAGNOSIS — F41.9 ANXIETY DISORDER, UNSPECIFIED: ICD-10-CM

## 2024-10-21 RX ORDER — FLUOXETINE HYDROCHLORIDE 40 MG/1
40 CAPSULE ORAL DAILY
Qty: 90 CAPSULE | Refills: 3 | Status: SHIPPED | OUTPATIENT
Start: 2024-10-21

## 2024-10-23 ENCOUNTER — OFFICE VISIT (OUTPATIENT)
Dept: ORTHOPEDIC SURGERY | Facility: CLINIC | Age: 59
End: 2024-10-23
Payer: COMMERCIAL

## 2024-10-23 ENCOUNTER — HOSPITAL ENCOUNTER (OUTPATIENT)
Dept: RADIOLOGY | Facility: CLINIC | Age: 59
Discharge: HOME | End: 2024-10-23
Payer: COMMERCIAL

## 2024-10-23 DIAGNOSIS — M79.671 BILATERAL FOOT PAIN: ICD-10-CM

## 2024-10-23 DIAGNOSIS — M79.672 BILATERAL FOOT PAIN: ICD-10-CM

## 2024-10-23 DIAGNOSIS — S93.401A SPRAIN OF RIGHT ANKLE, UNSPECIFIED LIGAMENT, INITIAL ENCOUNTER: Primary | ICD-10-CM

## 2024-10-23 PROCEDURE — 3061F NEG MICROALBUMINURIA REV: CPT | Performed by: STUDENT IN AN ORGANIZED HEALTH CARE EDUCATION/TRAINING PROGRAM

## 2024-10-23 PROCEDURE — 3049F LDL-C 100-129 MG/DL: CPT | Performed by: STUDENT IN AN ORGANIZED HEALTH CARE EDUCATION/TRAINING PROGRAM

## 2024-10-23 PROCEDURE — 73630 X-RAY EXAM OF FOOT: CPT | Mod: 50

## 2024-10-23 PROCEDURE — 99214 OFFICE O/P EST MOD 30 MIN: CPT | Performed by: STUDENT IN AN ORGANIZED HEALTH CARE EDUCATION/TRAINING PROGRAM

## 2024-10-23 PROCEDURE — 73630 X-RAY EXAM OF FOOT: CPT | Mod: BILATERAL PROCEDURE | Performed by: STUDENT IN AN ORGANIZED HEALTH CARE EDUCATION/TRAINING PROGRAM

## 2024-10-23 PROCEDURE — L1902 AFO ANKLE GAUNTLET PRE OTS: HCPCS | Performed by: STUDENT IN AN ORGANIZED HEALTH CARE EDUCATION/TRAINING PROGRAM

## 2024-10-23 PROCEDURE — 99213 OFFICE O/P EST LOW 20 MIN: CPT | Performed by: STUDENT IN AN ORGANIZED HEALTH CARE EDUCATION/TRAINING PROGRAM

## 2024-10-23 NOTE — PROGRESS NOTES
Acute Injury New Patient Visit    HPI: Lina is a 59 y.o.female who presents today with new complaints of bilateral foot and ankle injuries.  On 10/19/2024, while at the baseball game, she twisted her right ankle going into her seat.  She was seen at the urgent care.  She was treated for a right ankle sprain, but x-rays of the left ankle revealed remote fractures of the left second and third metatarsal bases, which she states is from an injury about 5 years ago, and she is having no pain over the left foot or ankle.  She has no swelling or bruising over the left foot or ankle.  She is placed in a boot for the left foot, and told to follow-up with orthopedics.  However, she is having more pain, swelling, and bruising over the lateral ankle of the right foot and ankle.  She takes meloxicam at baseline.    Plan: For this right moderate ankle sprain, we will place her in a lace up ankle brace here today, have her wear the boot from home for the right ankle, follow-up in 2 weeks with repeat x-rays of the right ankle, continue meloxicam, rest, ice, elevation, and activity modifications.  She may return to work on Monday.  Regarding the left foot and ankle, her exam is completely benign, she is having no pain, so she can use conservative treatment measures as needed, but return to activity as tolerated for the left foot and ankle.    Assessment:   Problem List Items Addressed This Visit    None  Visit Diagnoses       Sprain of right ankle, unspecified ligament, initial encounter    -  Primary    Relevant Orders    Ankle Brace, Lace Up or A60    Bilateral foot pain        Relevant Orders    XR foot 3+ views bilateral            Diagnostics: Reviewed all relevant imaging including x-ray, MRI, CT, and US.      Procedure:  Procedures    Physical Exam:  GENERAL:  No obvious acute distress.  NEURO:  Distally neurovascularly intact.  Sensation intact to light touch.  Extremity: Right ankle exam shows:  Skin is intact;  No  erythema or warmth;  No clinical signs of infection;  No pain over the lateral malleolus;  No pain over the medial malleolus;  TENDER over the ATF, CF and PTF ligaments with overlying swelling and bruising over the lateral foot and ankle into the dorsum of the foot;  No pain over the deltoid ligament;  No pain over the Achilles tendon;  Negative Sotomayor's test;  Negative squeeze test;  Negative anterior drawer test;  Negative talar tilt test;  No pain over the anterior process of the talus;  No pain over the talar dome;  No pain over the base of the fifth metatarsal bone;  No pain over the calcaneus;  No pain over the plantar aponeurosis;  No pain of the midfoot; and  Neurovascularly intact.    Left ankle exam shows:  Skin is intact;  No erythema or warmth;  No clinical signs of infection;  No pain over the lateral malleolus;  No pain over the medial malleolus;  No pain over the ATF, CF or PTF ligaments;  No pain over the deltoid ligament;  No pain over the Achilles tendon;  Negative Sotomayor's test;  Negative squeeze test;  Negative anterior drawer test;  Negative talar tilt test;  No pain over the anterior process of the talus;  No pain over the talar dome;  No pain over the base of the fifth metatarsal bone;  No pain over the calcaneus;  No pain over the plantar aponeurosis;  No pain of the midfoot; and  Neurovascularly intact.    Orders Placed This Encounter    Ankle Brace, Lace Up or A60    XR foot 3+ views bilateral      At the conclusion of the visit there were no further questions by the patient/family regarding their plan of care.  Patient was instructed to call or return with any issues, questions, or concerns regarding their injury and/or treatment plan described above.     10/23/24 at 4:48 PM - Wilfred Moreno DO    Office: [phone]    This note was prepared using voice recognition software.  The details of this note are correct and have been reviewed, and corrected to the best of my ability.   Some grammatical errors may persist related to the Dragon software.

## 2024-10-23 NOTE — LETTER
October 23, 2024     Patient: Lina Sharma   YOB: 1965   Date of Visit: 10/23/2024       To Whom It May Concern:    Lina Sharma was seen in my clinic on 10/23/2024 at 3:30 pm. Please excuse Lina for her absence from work on this day to make the appointment.  May return to work 10/28/24 with boot and or brace as pain tolerates.    If you have any questions or concerns, please don't hesitate to call.         Sincerely,         Wilfred Moreno DO        CC: No Recipients

## 2024-10-28 ENCOUNTER — APPOINTMENT (OUTPATIENT)
Dept: ENDOCRINOLOGY | Facility: CLINIC | Age: 59
End: 2024-10-28
Payer: COMMERCIAL

## 2024-10-28 VITALS
BODY MASS INDEX: 27.86 KG/M2 | WEIGHT: 199 LBS | DIASTOLIC BLOOD PRESSURE: 70 MMHG | HEIGHT: 71 IN | SYSTOLIC BLOOD PRESSURE: 118 MMHG

## 2024-10-28 DIAGNOSIS — Z97.8 USES SELF-APPLIED CONTINUOUS GLUCOSE MONITORING DEVICE: ICD-10-CM

## 2024-10-28 DIAGNOSIS — Z46.81 INSULIN PUMP TITRATION: ICD-10-CM

## 2024-10-28 DIAGNOSIS — E10.3299 TYPE 1 DIABETES MELLITUS WITH MILD NONPROLIFERATIVE RETINOPATHY WITHOUT MACULAR EDEMA, UNSPECIFIED LATERALITY: Primary | ICD-10-CM

## 2024-10-28 DIAGNOSIS — E03.9 HYPOTHYROIDISM, UNSPECIFIED TYPE: ICD-10-CM

## 2024-10-28 LAB
POC FINGERSTICK BLOOD GLUCOSE: 153 MG/DL (ref 70–100)
POC HEMOGLOBIN A1C: 7 % (ref 4.2–6.5)

## 2024-10-28 PROCEDURE — 3061F NEG MICROALBUMINURIA REV: CPT | Performed by: STUDENT IN AN ORGANIZED HEALTH CARE EDUCATION/TRAINING PROGRAM

## 2024-10-28 PROCEDURE — 83036 HEMOGLOBIN GLYCOSYLATED A1C: CPT | Performed by: STUDENT IN AN ORGANIZED HEALTH CARE EDUCATION/TRAINING PROGRAM

## 2024-10-28 PROCEDURE — 99214 OFFICE O/P EST MOD 30 MIN: CPT | Performed by: STUDENT IN AN ORGANIZED HEALTH CARE EDUCATION/TRAINING PROGRAM

## 2024-10-28 PROCEDURE — 82962 GLUCOSE BLOOD TEST: CPT | Performed by: STUDENT IN AN ORGANIZED HEALTH CARE EDUCATION/TRAINING PROGRAM

## 2024-10-28 PROCEDURE — 3078F DIAST BP <80 MM HG: CPT | Performed by: STUDENT IN AN ORGANIZED HEALTH CARE EDUCATION/TRAINING PROGRAM

## 2024-10-28 PROCEDURE — 3008F BODY MASS INDEX DOCD: CPT | Performed by: STUDENT IN AN ORGANIZED HEALTH CARE EDUCATION/TRAINING PROGRAM

## 2024-10-28 PROCEDURE — 3049F LDL-C 100-129 MG/DL: CPT | Performed by: STUDENT IN AN ORGANIZED HEALTH CARE EDUCATION/TRAINING PROGRAM

## 2024-10-28 PROCEDURE — 95251 CONT GLUC MNTR ANALYSIS I&R: CPT | Performed by: STUDENT IN AN ORGANIZED HEALTH CARE EDUCATION/TRAINING PROGRAM

## 2024-10-28 PROCEDURE — 3074F SYST BP LT 130 MM HG: CPT | Performed by: STUDENT IN AN ORGANIZED HEALTH CARE EDUCATION/TRAINING PROGRAM

## 2024-10-29 ENCOUNTER — APPOINTMENT (OUTPATIENT)
Dept: RHEUMATOLOGY | Facility: CLINIC | Age: 59
End: 2024-10-29
Payer: COMMERCIAL

## 2024-10-29 VITALS
HEART RATE: 68 BPM | WEIGHT: 201.3 LBS | DIASTOLIC BLOOD PRESSURE: 84 MMHG | SYSTOLIC BLOOD PRESSURE: 127 MMHG | BODY MASS INDEX: 28.48 KG/M2

## 2024-10-29 DIAGNOSIS — M81.0 OSTEOPOROSIS, UNSPECIFIED OSTEOPOROSIS TYPE, UNSPECIFIED PATHOLOGICAL FRACTURE PRESENCE: Primary | ICD-10-CM

## 2024-10-29 DIAGNOSIS — G89.4 CHRONIC PAIN SYNDROME: ICD-10-CM

## 2024-10-29 PROCEDURE — 3049F LDL-C 100-129 MG/DL: CPT | Performed by: INTERNAL MEDICINE

## 2024-10-29 PROCEDURE — 1036F TOBACCO NON-USER: CPT | Performed by: INTERNAL MEDICINE

## 2024-10-29 PROCEDURE — 99214 OFFICE O/P EST MOD 30 MIN: CPT | Performed by: INTERNAL MEDICINE

## 2024-10-29 PROCEDURE — 3061F NEG MICROALBUMINURIA REV: CPT | Performed by: INTERNAL MEDICINE

## 2024-10-29 PROCEDURE — 3074F SYST BP LT 130 MM HG: CPT | Performed by: INTERNAL MEDICINE

## 2024-10-29 PROCEDURE — 3079F DIAST BP 80-89 MM HG: CPT | Performed by: INTERNAL MEDICINE

## 2024-11-07 DIAGNOSIS — M81.0 OSTEOPOROSIS, UNSPECIFIED OSTEOPOROSIS TYPE, UNSPECIFIED PATHOLOGICAL FRACTURE PRESENCE: ICD-10-CM

## 2024-11-07 RX ORDER — ALENDRONATE SODIUM 70 MG/1
70 TABLET ORAL
Qty: 4 TABLET | Refills: 1 | Status: SHIPPED | OUTPATIENT
Start: 2024-11-07 | End: 2025-01-06

## 2024-11-08 DIAGNOSIS — M13.0 POLYARTHRITIS, UNSPECIFIED: ICD-10-CM

## 2024-11-08 RX ORDER — MELOXICAM 15 MG/1
15 TABLET ORAL DAILY PRN
Qty: 90 TABLET | Refills: 3 | Status: SHIPPED | OUTPATIENT
Start: 2024-11-08

## 2024-11-14 ENCOUNTER — APPOINTMENT (OUTPATIENT)
Dept: ORTHOPEDIC SURGERY | Facility: CLINIC | Age: 59
End: 2024-11-14
Payer: COMMERCIAL

## 2024-11-23 DIAGNOSIS — E78.2 MIXED HYPERLIPIDEMIA: ICD-10-CM

## 2024-11-25 RX ORDER — EZETIMIBE 10 MG/1
10 TABLET ORAL DAILY
Qty: 90 TABLET | Refills: 1 | Status: SHIPPED | OUTPATIENT
Start: 2024-11-25

## 2024-12-30 ENCOUNTER — APPOINTMENT (OUTPATIENT)
Dept: PRIMARY CARE | Facility: CLINIC | Age: 59
End: 2024-12-30
Payer: COMMERCIAL

## 2024-12-30 VITALS
TEMPERATURE: 97.7 F | HEART RATE: 62 BPM | HEIGHT: 71 IN | OXYGEN SATURATION: 91 % | SYSTOLIC BLOOD PRESSURE: 133 MMHG | DIASTOLIC BLOOD PRESSURE: 84 MMHG | WEIGHT: 203 LBS | BODY MASS INDEX: 28.42 KG/M2

## 2024-12-30 DIAGNOSIS — J06.9 UPPER RESPIRATORY TRACT INFECTION, UNSPECIFIED TYPE: Primary | ICD-10-CM

## 2024-12-30 DIAGNOSIS — J01.10 ACUTE NON-RECURRENT FRONTAL SINUSITIS: ICD-10-CM

## 2024-12-30 PROCEDURE — 3008F BODY MASS INDEX DOCD: CPT | Performed by: NURSE PRACTITIONER

## 2024-12-30 PROCEDURE — 1036F TOBACCO NON-USER: CPT | Performed by: NURSE PRACTITIONER

## 2024-12-30 PROCEDURE — 3061F NEG MICROALBUMINURIA REV: CPT | Performed by: NURSE PRACTITIONER

## 2024-12-30 PROCEDURE — 87637 SARSCOV2&INF A&B&RSV AMP PRB: CPT

## 2024-12-30 PROCEDURE — 3049F LDL-C 100-129 MG/DL: CPT | Performed by: NURSE PRACTITIONER

## 2024-12-30 PROCEDURE — 3079F DIAST BP 80-89 MM HG: CPT | Performed by: NURSE PRACTITIONER

## 2024-12-30 PROCEDURE — 99213 OFFICE O/P EST LOW 20 MIN: CPT | Performed by: NURSE PRACTITIONER

## 2024-12-30 PROCEDURE — 3075F SYST BP GE 130 - 139MM HG: CPT | Performed by: NURSE PRACTITIONER

## 2024-12-30 RX ORDER — AMOXICILLIN AND CLAVULANATE POTASSIUM 875; 125 MG/1; MG/1
875 TABLET, FILM COATED ORAL 2 TIMES DAILY
Qty: 14 TABLET | Refills: 0 | Status: SHIPPED | OUTPATIENT
Start: 2024-12-30 | End: 2025-01-06

## 2024-12-30 RX ORDER — BENZONATATE 200 MG/1
200 CAPSULE ORAL 3 TIMES DAILY PRN
Qty: 42 CAPSULE | Refills: 0 | Status: SHIPPED | OUTPATIENT
Start: 2024-12-30 | End: 2025-01-29

## 2024-12-30 NOTE — PROGRESS NOTES
Subjective   Patient ID: Lina Sharma is a 59 y.o. female who presents for Sick Visit.    Started 12/25/24  Had cough prior to, ongoing for weeks  Then it got bad xmas day  Cough getting worse over a week   Headache  stomach ache   coughing fits   makes her vomit,   runny nose ,   wheezing   Cough not really productive- more dry   Plugged ears  No sore throat  Did home test, was neg (unsure exp date)  Using inhaler  Using amoxicillin x4 days          Review of Systems  ROS completely negative except what was mentioned in the HPI.  Problem List, surgical, social, and family histories which were reviewed and updated as necessary within the EMR. I also personally reviewed the notes, labs, and imaging that pertained to what was documented or discussed in the HPI.    Objective   Physical Exam  Vitals and nursing note reviewed.   Constitutional:       General: She is not in acute distress.     Appearance: Normal appearance.   HENT:      Head: Normocephalic and atraumatic.      Right Ear: Tympanic membrane, ear canal and external ear normal.      Left Ear: Tympanic membrane, ear canal and external ear normal.      Nose: Nose normal.      Mouth/Throat:      Mouth: Mucous membranes are moist.   Eyes:      Extraocular Movements: Extraocular movements intact.      Conjunctiva/sclera: Conjunctivae normal.      Pupils: Pupils are equal, round, and reactive to light.   Cardiovascular:      Rate and Rhythm: Normal rate and regular rhythm.      Pulses: Normal pulses.      Heart sounds: Normal heart sounds.   Pulmonary:      Effort: Pulmonary effort is normal.      Breath sounds: Normal breath sounds.   Musculoskeletal:         General: Normal range of motion.      Cervical back: Normal range of motion and neck supple.   Skin:     General: Skin is warm and dry.   Neurological:      General: No focal deficit present.      Mental Status: She is alert and oriented to person, place, and time. Mental status is at baseline.  "  Psychiatric:         Mood and Affect: Mood normal.         Behavior: Behavior normal.         Thought Content: Thought content normal.         Judgment: Judgment normal.         /84   Pulse 62   Temp 36.5 °C (97.7 °F) (Temporal)   Ht 1.791 m (5' 10.5\")   Wt 92.1 kg (203 lb)   LMP  (LMP Unknown)   SpO2 91%   BMI 28.72 kg/m²     Assessment/Plan    Problem List Items Addressed This Visit    None  Visit Diagnoses       Upper respiratory tract infection, unspecified type    -  Primary    Discussed viral vs bacterial.  Advised to wait on PCR results.  If neg and not starting to feel slightly better in next 1-2 days, can begin augmentin    Relevant Medications    benzonatate (Tessalon) 200 mg capsule    Other Relevant Orders    Sars-CoV-2 and Influenza A/B PCR    RSV PCR    Acute non-recurrent frontal sinusitis        Relevant Medications    amoxicillin-pot clavulanate (Augmentin) 875-125 mg tablet           "

## 2024-12-31 LAB
FLUAV RNA RESP QL NAA+PROBE: NOT DETECTED
FLUBV RNA RESP QL NAA+PROBE: NOT DETECTED
RSV RNA RESP QL NAA+PROBE: NOT DETECTED
SARS-COV-2 RNA RESP QL NAA+PROBE: NOT DETECTED

## 2025-01-12 DIAGNOSIS — M13.0 POLYARTHRITIS, UNSPECIFIED: ICD-10-CM

## 2025-01-12 DIAGNOSIS — E10.9 TYPE 1 DIABETES MELLITUS WITHOUT COMPLICATION: ICD-10-CM

## 2025-01-12 DIAGNOSIS — M54.50 CHRONIC BILATERAL LOW BACK PAIN WITHOUT SCIATICA: ICD-10-CM

## 2025-01-12 DIAGNOSIS — M12.9 ARTHROPATHY: ICD-10-CM

## 2025-01-12 DIAGNOSIS — G89.29 CHRONIC BILATERAL LOW BACK PAIN WITHOUT SCIATICA: ICD-10-CM

## 2025-01-12 RX ORDER — SPIRONOLACTONE 50 MG/1
50 TABLET, FILM COATED ORAL DAILY
Qty: 90 TABLET | Refills: 3 | Status: SHIPPED | OUTPATIENT
Start: 2025-01-12

## 2025-01-12 RX ORDER — GABAPENTIN 300 MG/1
CAPSULE ORAL
Qty: 360 CAPSULE | Refills: 3 | Status: SHIPPED | OUTPATIENT
Start: 2025-01-12

## 2025-01-13 ENCOUNTER — APPOINTMENT (OUTPATIENT)
Dept: ENDOCRINOLOGY | Facility: CLINIC | Age: 60
End: 2025-01-13
Payer: COMMERCIAL

## 2025-01-13 VITALS
TEMPERATURE: 97.7 F | HEART RATE: 69 BPM | HEIGHT: 70 IN | DIASTOLIC BLOOD PRESSURE: 86 MMHG | SYSTOLIC BLOOD PRESSURE: 137 MMHG | WEIGHT: 200.7 LBS | BODY MASS INDEX: 28.73 KG/M2

## 2025-01-13 DIAGNOSIS — E66.3 OVERWEIGHT WITH BODY MASS INDEX (BMI) OF 28 TO 28.9 IN ADULT: ICD-10-CM

## 2025-01-13 DIAGNOSIS — E10.9 TYPE 1 DIABETES MELLITUS WITHOUT COMPLICATION: ICD-10-CM

## 2025-01-13 DIAGNOSIS — E78.2 MIXED HYPERLIPIDEMIA: ICD-10-CM

## 2025-01-13 DIAGNOSIS — E10.3299 TYPE 1 DIABETES MELLITUS WITH MILD NONPROLIFERATIVE RETINOPATHY WITHOUT MACULAR EDEMA, UNSPECIFIED LATERALITY: Primary | ICD-10-CM

## 2025-01-13 DIAGNOSIS — Z97.8 USES SELF-APPLIED CONTINUOUS GLUCOSE MONITORING DEVICE: ICD-10-CM

## 2025-01-13 DIAGNOSIS — E03.9 HYPOTHYROIDISM, UNSPECIFIED TYPE: ICD-10-CM

## 2025-01-13 DIAGNOSIS — Z46.81 INSULIN PUMP TITRATION: ICD-10-CM

## 2025-01-13 PROCEDURE — 99215 OFFICE O/P EST HI 40 MIN: CPT | Performed by: NURSE PRACTITIONER

## 2025-01-13 PROCEDURE — 3079F DIAST BP 80-89 MM HG: CPT | Performed by: NURSE PRACTITIONER

## 2025-01-13 PROCEDURE — 3008F BODY MASS INDEX DOCD: CPT | Performed by: NURSE PRACTITIONER

## 2025-01-13 PROCEDURE — 3075F SYST BP GE 130 - 139MM HG: CPT | Performed by: NURSE PRACTITIONER

## 2025-01-13 PROCEDURE — 1036F TOBACCO NON-USER: CPT | Performed by: NURSE PRACTITIONER

## 2025-01-13 PROCEDURE — 95251 CONT GLUC MNTR ANALYSIS I&R: CPT | Performed by: NURSE PRACTITIONER

## 2025-01-13 PROCEDURE — G2211 COMPLEX E/M VISIT ADD ON: HCPCS | Performed by: NURSE PRACTITIONER

## 2025-01-13 RX ORDER — INSULIN ASPART 100 [IU]/ML
INJECTION, SOLUTION INTRAVENOUS; SUBCUTANEOUS
Qty: 70 ML | Refills: 1 | Status: SHIPPED | OUTPATIENT
Start: 2025-01-13 | End: 2025-01-13 | Stop reason: SDUPTHER

## 2025-01-13 RX ORDER — GLUCAGON INJECTION, SOLUTION 1 MG/.2ML
INJECTION, SOLUTION SUBCUTANEOUS
Qty: 0.2 ML | Refills: 11 | Status: SHIPPED | OUTPATIENT
Start: 2025-01-13

## 2025-01-13 RX ORDER — INSULIN ASPART 100 [IU]/ML
INJECTION, SOLUTION INTRAVENOUS; SUBCUTANEOUS
Qty: 90 ML | Refills: 3 | Status: SHIPPED | OUTPATIENT
Start: 2025-01-13

## 2025-01-13 ASSESSMENT — ENCOUNTER SYMPTOMS
DIARRHEA: 0
POLYDIPSIA: 0
SHORTNESS OF BREATH: 0
PALPITATIONS: 0
CONSTIPATION: 0
APPETITE CHANGE: 0
NAUSEA: 0
DIZZINESS: 0
WEAKNESS: 0
ACTIVITY CHANGE: 0
NUMBNESS: 0
OCCASIONAL FEELINGS OF UNSTEADINESS: 1
DEPRESSION: 1
FATIGUE: 0
SEIZURES: 0
LOSS OF SENSATION IN FEET: 0
NERVOUS/ANXIOUS: 0
FREQUENCY: 0
POLYPHAGIA: 0
SLEEP DISTURBANCE: 0

## 2025-01-13 ASSESSMENT — PATIENT HEALTH QUESTIONNAIRE - PHQ9
SUM OF ALL RESPONSES TO PHQ9 QUESTIONS 1 AND 2: 6
6. FEELING BAD ABOUT YOURSELF - OR THAT YOU ARE A FAILURE OR HAVE LET YOURSELF OR YOUR FAMILY DOWN: NEARLY EVERY DAY
2. FEELING DOWN, DEPRESSED OR HOPELESS: NEARLY EVERY DAY
8. MOVING OR SPEAKING SO SLOWLY THAT OTHER PEOPLE COULD HAVE NOTICED. OR THE OPPOSITE, BEING SO FIGETY OR RESTLESS THAT YOU HAVE BEEN MOVING AROUND A LOT MORE THAN USUAL: NOT AT ALL
3. TROUBLE FALLING OR STAYING ASLEEP OR SLEEPING TOO MUCH: SEVERAL DAYS
4. FEELING TIRED OR HAVING LITTLE ENERGY: NEARLY EVERY DAY
9. THOUGHTS THAT YOU WOULD BE BETTER OFF DEAD, OR OF HURTING YOURSELF: NOT AT ALL
5. POOR APPETITE OR OVEREATING: MORE THAN HALF THE DAYS
7. TROUBLE CONCENTRATING ON THINGS, SUCH AS READING THE NEWSPAPER OR WATCHING TELEVISION: NOT AT ALL
SUM OF ALL RESPONSES TO PHQ QUESTIONS 1-9: 15
1. LITTLE INTEREST OR PLEASURE IN DOING THINGS: NEARLY EVERY DAY
10. IF YOU CHECKED OFF ANY PROBLEMS, HOW DIFFICULT HAVE THESE PROBLEMS MADE IT FOR YOU TO DO YOUR WORK, TAKE CARE OF THINGS AT HOME, OR GET ALONG WITH OTHER PEOPLE: VERY DIFFICULT

## 2025-01-13 NOTE — PROGRESS NOTES
Subjective   Lina Sharma is a 59 y.o. female here today for a new patient visit regarding Type 1 diabetes. Initial diagnosis with diabetes was age 15.      She is using 2 different pump pattern settings one for weekday and one for weekends as she walks 5 miles a day on work days and does less on weekends.     She has hashimoto's thyroid disease and is mitigated on levothyroxine 137 mcg daily.     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     The patient has a family history none.  She has 2 children that states she has had screened for type 1 at some point in the past, does not remember when.     Known complications include: None.     Previously seeing Sreekanth Frazier for diabetes care.    A1c 7% October 2024.  Previous A1c % in office today    Historical meds:  Used Medtronic pump in the past    Current diabetes regimen is as follows:   Tandem insulin pump with Dexcom G6    Patient is using continuous glucose monitor- Dexcom G6  The patient is currently checking the blood glucose as needed.    Hypoglycemia frequency: 0.9%  Hypoglycemia awareness: States she has had some that she has not felt, but mostly she can      Regarding symptoms of hyperglycemia, the patient is not experiencing symptoms such as polydipsia, polyuria, nocturia, blurry vision, and unintentional weight loss.     Last foot exam: None. Denies issues at today's visit.   Last eye exam: Fall 2024: Denies retinopathy.   Last urine albumin: <7 June 2024  Last LDL: Total 225 with  Trig 165 June 2024. Cannot tolerate statin but is taking ezetimibe daily.   TSH 3.06 April 2024    Review of Systems   Constitutional:  Negative for activity change, appetite change and fatigue.   Respiratory:  Negative for shortness of breath.    Cardiovascular:  Negative for chest pain, palpitations and leg swelling.   Gastrointestinal:  Negative for constipation, diarrhea and nausea.   Endocrine:  "Negative for cold intolerance, heat intolerance, polydipsia, polyphagia and polyuria.   Genitourinary:  Negative for frequency.   Musculoskeletal:  Negative for gait problem.   Skin:  Negative for rash.   Neurological:  Negative for dizziness, seizures, weakness and numbness.   Psychiatric/Behavioral:  Negative for sleep disturbance and suicidal ideas. The patient is not nervous/anxious.       Objective    Blood pressure 137/86, pulse 69, temperature 36.5 °C (97.7 °F), temperature source Temporal, height 1.778 m (5' 10\"), weight 91 kg (200 lb 11.2 oz).  Physical Exam  Vitals and nursing note reviewed.   HENT:      Head: Atraumatic.   Pulmonary:      Effort: Pulmonary effort is normal.   Skin:     General: Skin is warm and dry.   Neurological:      General: No focal deficit present.      Mental Status: She is alert and oriented to person, place, and time. Mental status is at baseline.      Gait: Gait normal.   Psychiatric:         Mood and Affect: Mood normal.         Behavior: Behavior normal.         Thought Content: Thought content normal.         Judgment: Judgment normal.             Lab Results   Component Value Date    BILITOT 0.3 05/01/2024    CALCIUM 9.4 05/01/2024    CO2 28 05/01/2024     05/01/2024    CREATININE 0.93 05/01/2024    GLUCOSE 197 (H) 05/01/2024    ALKPHOS 125 (H) 06/07/2024    K 4.4 05/01/2024    PROT 7.3 09/18/2024     05/01/2024    AST 22 05/01/2024    ALT 18 05/01/2024    BUN 20 05/01/2024    ANIONGAP 11 05/01/2024    MG 1.89 06/07/2024    PHOS 4.4 06/07/2024    ALBUMIN 4.1 05/01/2024    GFRF 76 08/07/2023     Lab Results   Component Value Date    TRIG 165 (H) 06/07/2024    CHOL 225 (H) 06/07/2024    LDLCALC 125 (H) 06/07/2024    HDL 67.5 06/07/2024     Lab Results   Component Value Date    HGBA1C 7.0 (A) 10/28/2024    HGBA1C 7.2 (A) 04/15/2024    HGBA1C 6.9 (A) 01/04/2024       The 10-year ASCVD risk score (Jered NUNEZ, et al., 2019) is: 5%    Values used to calculate the " score:      Age: 59 years      Sex: Female      Is Non- : No      Diabetic: Yes      Tobacco smoker: No      Systolic Blood Pressure: 137 mmHg      Is BP treated: No      HDL Cholesterol: 67.5 mg/dL      Total Cholesterol: 225 mg/dL    Assessment/Plan   Problem List Items Addressed This Visit       Hypothyroidism    Mixed hyperlipidemia    Type 1 diabetes mellitus with mild nonproliferative diabetic retinopathy and without macular edema - Primary     Other Visit Diagnoses       Insulin pump titration        Uses self-applied continuous glucose monitoring device        Overweight with body mass index (BMI) of 28 to 28.9 in adult              Type 1 diabetes mellitus, is at goal.   RX changes:   No changes to pump settings, rather, she focus on pre-bolusing for higher carb meals.   Hypothyroidism: TSH updated labs ordered. Continue 137 mcg daily.   BMI 28 Instructed to increase whole fruits/vegestables & reduce packaged/processed food   Depression score elevated: We discussed use of [employer] [phone]. Please call if you are struggling with suicidal ideation.   Education:  complications of diabetes mellitus and hypoglycemia prevention and treatment  Follow up: I recommend diabetes care be 3 months.

## 2025-01-13 NOTE — PATIENT INSTRUCTIONS
Type 1 diabetes mellitus, is at goal.   RX changes:   No changes to pump settings, rather, she focus on pre-bolusing for higher carb meals.   Hypothyroidism: TSH updated labs ordered. Continue 137 mcg daily.   BMI 28 Instructed to increase whole fruits/vegestables & reduce packaged/processed food   Depression score elevated: We discussed use of [employer] [phone]. Please call if you are struggling with suicidal ideation.   Education:  complications of diabetes mellitus and hypoglycemia prevention and treatment  Follow up: I recommend diabetes care be 3 months.

## 2025-01-15 RX ORDER — CELECOXIB 200 MG/1
200 CAPSULE ORAL 2 TIMES DAILY
Qty: 30 CAPSULE | Refills: 0 | OUTPATIENT
Start: 2025-01-15

## 2025-02-11 ENCOUNTER — OFFICE VISIT (OUTPATIENT)
Dept: ORTHOPEDIC SURGERY | Facility: CLINIC | Age: 60
End: 2025-02-11
Payer: COMMERCIAL

## 2025-02-11 ENCOUNTER — HOSPITAL ENCOUNTER (OUTPATIENT)
Dept: RADIOLOGY | Facility: CLINIC | Age: 60
Discharge: HOME | End: 2025-02-11
Payer: COMMERCIAL

## 2025-02-11 DIAGNOSIS — S92.321A CLOSED DISPLACED FRACTURE OF SECOND METATARSAL BONE OF RIGHT FOOT, INITIAL ENCOUNTER: ICD-10-CM

## 2025-02-11 DIAGNOSIS — M79.672 PAIN IN BOTH FEET: ICD-10-CM

## 2025-02-11 DIAGNOSIS — M25.50 ARTHRALGIA, UNSPECIFIED JOINT: ICD-10-CM

## 2025-02-11 DIAGNOSIS — M79.671 PAIN IN BOTH FEET: ICD-10-CM

## 2025-02-11 DIAGNOSIS — M20.21 HALLUX RIGIDUS, RIGHT FOOT: ICD-10-CM

## 2025-02-11 DIAGNOSIS — M25.571 PAIN IN JOINTS OF BOTH FEET: Primary | ICD-10-CM

## 2025-02-11 DIAGNOSIS — M25.572 PAIN IN JOINTS OF BOTH FEET: Primary | ICD-10-CM

## 2025-02-11 DIAGNOSIS — M76.829 POSTERIOR TIBIAL TENDON DYSFUNCTION: ICD-10-CM

## 2025-02-11 PROCEDURE — 99214 OFFICE O/P EST MOD 30 MIN: CPT | Performed by: ORTHOPAEDIC SURGERY

## 2025-02-11 PROCEDURE — 99244 OFF/OP CNSLTJ NEW/EST MOD 40: CPT | Performed by: ORTHOPAEDIC SURGERY

## 2025-02-11 PROCEDURE — 73630 X-RAY EXAM OF FOOT: CPT | Mod: 50

## 2025-02-11 PROCEDURE — 73630 X-RAY EXAM OF FOOT: CPT | Mod: BILATERAL PROCEDURE | Performed by: RADIOLOGY

## 2025-02-11 NOTE — PATIENT INSTRUCTIONS
Please call and schedule an appointment to get fitted or molded for your custom made orthotics (see your prescription for phone number).  You need to bring your prescription with you to your appointment.  Insurance coverage for orthotics varies widely and you can contact your insurance company to find out whether orthotics are covered or not with your plan.  The orthotics company also can give you a cost estimate.     Once your custom made orthotics are made and ready to use, then take out your shoe's insert that it came with and put the orthotic in your shoe.  Orthotics generally work better in lace-up types of shoes (athletic shoes, clarks, keans, merrells, sketchers, etc.).  Sometimes, to accommodate your orthotic, you may have to wear a ½ to one size bigger shoe.  Your orthotics can be adjusted for comfort.  Your pedorthotist can help with adjustments after you wear your orthotics for an initial period of time.    The patient was given a prescription for physical therapy.  Physical therapy is medically necessary to improve strength, balance, range of motion and functional outcomes after injury and/or surgery.    You can use a full length carbon fiber plate under your shoe inserts in a good athletic type shoe or boot.     Follow up as needed

## 2025-02-11 NOTE — PROGRESS NOTES
NPV-   History of Present Illness  59 y.o.female here by Dr. Jose Carlos Nunez for b/l foot pain  1. Pain in both feet  XR foot 3+ views bilateral      2. Closed displaced fracture of second metatarsal bone of right foot, initial encounter  Referral to Orthopaedic Surgery        Mechanism of injury: none, hx of stress fractures in foot in 2018 after running half marathon   Date of Injury/Pain: ongoing  Location of pain: midfoot/arch  Frequency of Pain: worse with walking or standing; start up pain  Associated symptoms? Swelling.  Previous treatment?  Seen by rheumatology, negative testing  HX of DM type 1;       REVIEW OF SYSTEMS  27 point review of systems negative except what is stated in HPI    Constitutional Exam: patient's height and weight reviewed, well-kempt  Psychiatric Exam: alert and oriented x 3, appropriate mood and behavior  Eye Exam: BING GARCIA  Pulmonary Exam: breathing non-labored, no apparent distress  Lymphatic exam: no appreciable lymphadenopathy in the lower extremities  Cardiovascular exam: DP pulses 2+ bilaterally, PT 2+ bilaterally, toes are pink with good capillary refill, no pitting edema  Skin exam: no open lesions, rashes, abrasions or ulcerations  Neurological exam: sensation to light touch intact in both lower extremities in peripheral and dermatomal distributions (except for any abnormalities noted in musculoskeletal exam)    PHYSICAL EXAM  On examination of the left ankle/foot:  Normal gait  Normal alignment  Minimal swelling and no ecchymosis of the lateral foot. no erythema. Skin intact; no ulcers or lesions.   Normal ROM in  ankle plantarflexion, dorsiflexion, inversion and eversion; minimal Normal ROM in 2-5th toe flexion/extension and 1st MTP flexion/extension  Normal strength in ankle plantarflexion, dorsiflexion, inversion and eversion; normal strength with great toe flexion/extension. No pain with eversion  Tenderness to palpation: none  No tenderness to palpation over the  Achilles, medial and lateral malleolus, posterior tibial tendon, peroneal tendon, ATFL, deltoid ligament, talus or navicular.  no tenderness to palpation over heel, plantar arch, 5th metatarsal, base of 1st metatarsal/2nd metatarsal, sesamoids, medial cuneiform, navicular, 1st MTP joint or 2nd or 3rd intermetarsal space.  Neurovascularly intact. Good capillary refill. No sensory deficit to light touch. Normal proprioception. Dorsalis pedis and posterior tibial pulses 2+ bilaterally.    - Sotomayor's test                              - Dorsiflexion-eversion test  - Anterior Drawer test                      - resisted dorsiflexion-eversion test  - Talar tilt test                                    - shuck testing  - Squeeze test    On examination of the right ankle/foot:  Normal gait  Normal alignment  No swelling; No ecchymosis or erythema. Skin intact; no ulcers or lesions.   Normal ROM in  ankle plantarflexion, dorsiflexion, inversion and eversion; normal ROM in 2-5th toe flexion/extension and decreased 1st MTP flexion/extension  Normal strength in ankle plantarflexion, dorsiflexion, inversion and eversion; normal strength with great toe flexion/extension  Tenderness to palpation: none  No tenderness to palpation over the Achilles, medial and lateral malleolus, posterior tibial tendon, peroneal tendon, ATFL, deltoid ligament, talus or navicular.  no tenderness to palpation over heel, plantar arch, base of 1st metatarsal/2nd metatarsal, sesamoids, 5th metatarsal, medial cuneiform, navicular, 1st MTP joint or 2nd or 3rd intermetarsal space.  Neurovascularly intact. Good capillary refill. No sensory deficit to light touch. Normal proprioception. Dorsalis pedis and posterior tibial pulses 2+ bilaterally.    - Sotomayor's test                              - Dorsiflexion-eversion test  - Anterior Drawer test                      - resisted dorsiflexion-eversion test  - Talar tilt test                                    -  "shuck testing  - Squeeze test  - \"too many toes\" sign     IMAGING  I personally reviewed the patient's x-ray images and reports of the left foot. The xrays show subacute fractures of the 2nd and 3rd metatarsal bases. No other fractures or dislocation.  Normal joint spacing     I personally reviewed the patient's x-ray images and reports of the right foot. The xrays show that no fractures or dislocations.  Degenerative changes of the 1st MTP joint        ASSESSMENT: b/l foot PTTD grade 2, arthralgia, right foot hallux rigidus     PLAN: I discussed with the patient the differential diagnosis, complex overuse and degenerative related nature of the condition(s) and available treatment option(s). Patient is ambulatory and was given a prescription for orthotics with full length carbon fiber plate, which are medically necessary due to the patient's medical condition and symptomatic posterior tibial tendon dysfunction. Patient was given a handout and instructed on an at home stretching program.  They should do these exercises 3 times per day for 6 weeks and then daily. Patient can use OTC Voltaren gel, biofreeze or  aspercream for pain and continue to ice and elevate supported at the calf to reduce swelling. The patient was given a prescription for physical therapy.  Physical therapy is medically necessary to improve strength, balance, range of motion and functional outcomes after injury and/or surgery. All the patient's questions were answered. The patient agrees with the above plan.  Follow up as needed      "

## 2025-02-18 ENCOUNTER — TELEPHONE (OUTPATIENT)
Dept: ENDOCRINOLOGY | Facility: CLINIC | Age: 60
End: 2025-02-18
Payer: COMMERCIAL

## 2025-02-18 NOTE — TELEPHONE ENCOUNTER
Initiated order with [employer] for upgrade to G7 via [employer] on 2/18  Notes uploaded. Pending response from distributor.

## 2025-02-24 ENCOUNTER — APPOINTMENT (OUTPATIENT)
Dept: ENDOCRINOLOGY | Facility: CLINIC | Age: 60
End: 2025-02-24
Payer: COMMERCIAL

## 2025-02-24 NOTE — PATIENT INSTRUCTIONS
"  GENERAL INFORMATION ABOUT VIRAL ILLNESSES    The typical course of an acute virus is rapid onset of symptoms that peak around day 3 through 5.  Symptoms begin to taper, then slowly improve each day around day 7-10 and thereafter.  Many times, individuals can have a nagging \"post viral cough\" that can last for several weeks that require only symptomatic care.  Post viral cough tends to be worse at night, productive in the morning, and improved throughout the day.  If initial symptoms improve and then worsen around 10 days, or simply do not improve, this could indicate now a bacterial infection and warrant a course of antibiotics & further evaluation.    Over the counter remedies include, but not limited to:    Delsym (Dextromethorphan) - to help suppress a cough  Mucinex (Guaifenesin) - an expectorant to loosen mucous and allow a cough to be more productive, decreasing risk for pneumonia or sinusitis  Mucinex DM (Guaifenesin & Dextromethorphan) - cough suppressant and expectorant in one  Flonase or Rhinocort - a nasal steroid spray to help open the sinuses, improve ear pain, & improve post nasal drip that may be contributing to the cough.  This is not used \"as needed\" but daily throughout the illness to be effective.  Nasal Saline spray - to keep mucous thin so it is easier to eliminate    Cough drops, hot tea, warm salt water gargles, Airborne, Tylenol &/or Ibuprofen    Some individuals may be prescribed Tessalon Pearls to help with cough, Albuterol Inhaler to help with wheezing and shortness of breath, oral or inhaled steroids for other lung complications    " [Time Spent: ___ minutes] : I have spent [unfilled] minutes of time on the encounter which excludes teaching and separately reported services.

## 2025-04-09 ENCOUNTER — TELEPHONE (OUTPATIENT)
Dept: ENDOCRINOLOGY | Facility: CLINIC | Age: 60
End: 2025-04-09
Payer: COMMERCIAL

## 2025-04-09 NOTE — TELEPHONE ENCOUNTER
This nurse called the patient at [phone] regarding the scheduled appointment on Friday, May 02, 2025. The appointment needs to be moved to Tuesday, April 29, 2025 or Wednesday, April 30, 2025 due to the provider not being in the office on Friday, May 02, 2025. The patient verbalized understanding of the information provided and picked the date and time of Tuesday, April 29, 2025 at 8:15 AM virtually. The patient was appreciative of the call.

## 2025-04-29 ENCOUNTER — APPOINTMENT (OUTPATIENT)
Dept: ENDOCRINOLOGY | Facility: CLINIC | Age: 60
End: 2025-04-29
Payer: COMMERCIAL

## 2025-04-29 DIAGNOSIS — E78.2 MIXED HYPERLIPIDEMIA: ICD-10-CM

## 2025-04-29 DIAGNOSIS — E66.3 OVERWEIGHT WITH BODY MASS INDEX (BMI) OF 28 TO 28.9 IN ADULT: ICD-10-CM

## 2025-04-29 DIAGNOSIS — E03.9 HYPOTHYROIDISM, UNSPECIFIED TYPE: ICD-10-CM

## 2025-04-29 DIAGNOSIS — Z97.8 USES SELF-APPLIED CONTINUOUS GLUCOSE MONITORING DEVICE: ICD-10-CM

## 2025-04-29 DIAGNOSIS — E10.3299 TYPE 1 DIABETES MELLITUS WITH MILD NONPROLIFERATIVE RETINOPATHY WITHOUT MACULAR EDEMA, UNSPECIFIED LATERALITY: Primary | ICD-10-CM

## 2025-04-29 DIAGNOSIS — Z46.81 INSULIN PUMP TITRATION: ICD-10-CM

## 2025-04-29 PROCEDURE — 99214 OFFICE O/P EST MOD 30 MIN: CPT | Performed by: NURSE PRACTITIONER

## 2025-04-29 PROCEDURE — 95251 CONT GLUC MNTR ANALYSIS I&R: CPT | Performed by: NURSE PRACTITIONER

## 2025-04-29 ASSESSMENT — ENCOUNTER SYMPTOMS
POLYPHAGIA: 0
DIZZINESS: 0
CONSTIPATION: 0
SLEEP DISTURBANCE: 0
PALPITATIONS: 0
SHORTNESS OF BREATH: 0
APPETITE CHANGE: 0
SEIZURES: 0
FATIGUE: 0
NUMBNESS: 0
NERVOUS/ANXIOUS: 0
NAUSEA: 0
DIARRHEA: 0
FREQUENCY: 0
POLYDIPSIA: 0
WEAKNESS: 0
ACTIVITY CHANGE: 0

## 2025-04-29 NOTE — PATIENT INSTRUCTIONS
Type 1 diabetes mellitus, is at goal.   RX changes:   No changes to pump settings, rather, she focus on pre-bolusing for higher carb meals.   Hypothyroidism: TSH updated labs ordered. Continue 137 mcg daily.   BMI 28 Instructed to increase whole fruits/vegestables & reduce packaged/processed food   Depression score elevated at last visit. expect a phone call from our team at [phone] within 24-48 hours (except on weekends). Please call us if you are struggling with suicidal ideation.   Education:  complications of diabetes mellitus and hypoglycemia prevention and treatment  Follow up: I recommend diabetes care be 3 months.

## 2025-04-29 NOTE — PROGRESS NOTES
Subjective   Lina Sharma is a 59 y.o. female here today for a follow up patient visit regarding Type 1 diabetes. Initial diagnosis with diabetes was age 15.      She is using 2 different pump pattern settings one for weekday and one for weekends as she walks 5 miles a day on work days and does less on weekends.     She has hashimoto's thyroid disease and is mitigated on levothyroxine 137 mcg daily.     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     The patient has a family history none.  She has 2 children that states she has had screened for type 1 at some point in the past, does not remember when.     Known complications include: None.     A1c 7% October 2024.  Previous A1c % in office today    Historical meds:  Used Medtronic pump in the past    Current diabetes regimen is as follows:   Tandem insulin pump with Dexcom G7  States she ran out of G6 sensors for the last 2 months, but received G7 sensors 20 days ago and has been using it ever since.     Patient is using continuous glucose monitor- Dexcom G7  The patient is currently checking the blood glucose as needed.    Hypoglycemia frequency: 0.9%  Hypoglycemia awareness: States she has had some that she has not felt, but mostly she can      Regarding symptoms of hyperglycemia, the patient is not experiencing symptoms such as polydipsia, polyuria, nocturia, blurry vision, and unintentional weight loss.     Last foot exam: None. Denies issues at today's visit.   Last eye exam: Fall 2024: Denies retinopathy.   Last urine albumin: <7 June 2024  Last LDL: Total 225 with  Trig 165 June 2024. Cannot tolerate statin but is taking ezetimibe daily.   TSH 3.06 April 2024    Review of Systems   Constitutional:  Negative for activity change, appetite change and fatigue.   Respiratory:  Negative for shortness of breath.    Cardiovascular:  Negative for chest pain, palpitations and leg swelling.    Gastrointestinal:  Negative for constipation, diarrhea and nausea.   Endocrine: Negative for cold intolerance, heat intolerance, polydipsia, polyphagia and polyuria.   Genitourinary:  Negative for frequency.   Musculoskeletal:  Negative for gait problem.   Skin:  Negative for rash.   Neurological:  Negative for dizziness, seizures, weakness and numbness.   Psychiatric/Behavioral:  Negative for sleep disturbance and suicidal ideas. The patient is not nervous/anxious.       Objective    There were no vitals taken for this visit.  Physical Exam  Vitals and nursing note reviewed.   HENT:      Head: Atraumatic.   Pulmonary:      Effort: Pulmonary effort is normal.   Skin:     General: Skin is warm and dry.   Neurological:      General: No focal deficit present.      Mental Status: She is alert and oriented to person, place, and time. Mental status is at baseline.      Gait: Gait normal.   Psychiatric:         Mood and Affect: Mood normal.         Behavior: Behavior normal.         Thought Content: Thought content normal.         Judgment: Judgment normal.             Lab Results   Component Value Date    BILITOT 0.3 05/01/2024    CALCIUM 9.4 05/01/2024    CO2 28 05/01/2024     05/01/2024    CREATININE 0.93 05/01/2024    GLUCOSE 197 (H) 05/01/2024    ALKPHOS 125 (H) 06/07/2024    K 4.4 05/01/2024    PROT 7.3 09/18/2024     05/01/2024    AST 22 05/01/2024    ALT 18 05/01/2024    BUN 20 05/01/2024    ANIONGAP 11 05/01/2024    MG 1.89 06/07/2024    PHOS 4.4 06/07/2024    ALBUMIN 4.1 05/01/2024    GFRF 76 08/07/2023     Lab Results   Component Value Date    TRIG 165 (H) 06/07/2024    CHOL 225 (H) 06/07/2024    LDLCALC 125 (H) 06/07/2024    HDL 67.5 06/07/2024     Lab Results   Component Value Date    HGBA1C 7.0 (A) 10/28/2024    HGBA1C 7.2 (A) 04/15/2024    HGBA1C 6.9 (A) 01/04/2024       The 10-year ASCVD risk score (Jered NUNEZ, et al., 2019) is: 5%    Values used to calculate the score:      Age: 59 years       Sex: Female      Is Non- : No      Diabetic: Yes      Tobacco smoker: No      Systolic Blood Pressure: 137 mmHg      Is BP treated: No      HDL Cholesterol: 67.5 mg/dL      Total Cholesterol: 225 mg/dL    dalia stacy  YOB: 1965  Last upload date: Apr 29, 2025, 8:06 AM  t:slim X2 (#[id number])    2 Weeks (Apr 16 - 29, 2025)  Target Glucose Range: 70 - 180 mg/dL    CGM summary    Average reading           162 mg/dL          Time in range             66%                Time CGM in use           100%               Standard deviation        57 mg/dL           Coefficient of variation  35%                GMI                       7.2%                     Time in range comparison    Current 2 weeks      >  250 mg/dL       7.7%               181  -  250 mg/dL  25%                70  -  180 mg/dL   66%                54  -  69 mg/dL    1.4%               <  54 mg/dL        0.2%                 Previous 2 weeks     >  250 mg/dL       9.6%               181  -  250 mg/dL  23%                70  -  180 mg/dL   66%                54  -  69 mg/dL    0.8%               <  54 mg/dL        0.6%                     Control-IQ summary    Time active      80%              10d 16 hr          CGM inactive     19%              2d 13 hr           Control-IQ off   0%               0 hr               Pump inactive    1%               2 hr                 Average sleep    Duration         0 hr               Weekly           0 times              Average exercise    Duration         0 hr               Weekly           0 times                  Insulin summary    Average daily dose     50.09 units        Basal                  37%              18.73 units        Bolus                  63%              31.34 units        Average daily boluses  11 boluses         Manual                 41%              4 boluses          Control-IQ             59%              6 boluses          Average daily carbs     97g                      Bolus review    Type               Food             61%              19.19 units        Correction       11%              3.49 units         Override         0%               0.00 units         Control-IQ       28%              8.66 units           Delivery Method    Standard         72%              22.69 units        Extended         0%               0.00 units         Quick            0%               0.00 units         Control-IQ       28%              8.66 units         Cartridge change  every 3.7d         Tubing fill       every 3.7d         Cannula fill      every 3.7d               Assessment/Plan   Problem List Items Addressed This Visit       Hypothyroidism    Mixed hyperlipidemia    Type 1 diabetes mellitus 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   RX changes:   No changes to pump settings, rather, she focus on pre-bolusing for higher carb meals.   Hypothyroidism: TSH updated labs ordered. Continue 137 mcg daily.   BMI 28 Instructed to increase whole fruits/vegestables & reduce packaged/processed food   Depression score elevated at last visit. Expect a phone call from our team at [phone] within 24-48 hours (except on weekends). Please call us if you are struggling with suicidal ideation.   Education:  complications of diabetes mellitus and hypoglycemia prevention and treatment  Follow up: I recommend diabetes care be 3 months.

## 2025-05-02 ENCOUNTER — APPOINTMENT (OUTPATIENT)
Dept: ENDOCRINOLOGY | Facility: CLINIC | Age: 60
End: 2025-05-02
Payer: COMMERCIAL

## 2025-05-30 DIAGNOSIS — K21.9 GASTRO-ESOPHAGEAL REFLUX DISEASE WITHOUT ESOPHAGITIS: ICD-10-CM

## 2025-05-30 RX ORDER — ESOMEPRAZOLE MAGNESIUM 40 MG/1
40 CAPSULE, DELAYED RELEASE ORAL DAILY
Qty: 90 CAPSULE | Refills: 3 | Status: SHIPPED | OUTPATIENT
Start: 2025-05-30

## 2025-06-06 LAB
25(OH)D3+25(OH)D2 SERPL-MCNC: 72 NG/ML (ref 30–100)
ALBUMIN SERPL-MCNC: 4.1 G/DL (ref 3.6–5.1)
ALP SERPL-CCNC: 98 U/L (ref 37–153)
ALT SERPL-CCNC: 17 U/L (ref 6–29)
ANION GAP SERPL CALCULATED.4IONS-SCNC: 10 MMOL/L (CALC) (ref 7–17)
APPEARANCE UR: CLEAR
AST SERPL-CCNC: 21 U/L (ref 10–35)
BACTERIA #/AREA URNS HPF: ABNORMAL /HPF
BACTERIA UR CULT: ABNORMAL
BACTERIA UR CULT: ABNORMAL
BASOPHILS # BLD AUTO: 83 CELLS/UL (ref 0–200)
BASOPHILS NFR BLD AUTO: 1.1 %
BILIRUB SERPL-MCNC: 0.5 MG/DL (ref 0.2–1.2)
BILIRUB UR QL STRIP: NEGATIVE
BUN SERPL-MCNC: 16 MG/DL (ref 7–25)
CALCIUM SERPL-MCNC: 9.5 MG/DL (ref 8.6–10.4)
CHLORIDE SERPL-SCNC: 104 MMOL/L (ref 98–110)
CHOLEST SERPL-MCNC: 195 MG/DL
CHOLEST/HDLC SERPL: 2.7 (CALC)
CO2 SERPL-SCNC: 27 MMOL/L (ref 20–32)
COLOR UR: YELLOW
CREAT SERPL-MCNC: 0.84 MG/DL (ref 0.5–1.03)
EGFRCR SERPLBLD CKD-EPI 2021: 80 ML/MIN/1.73M2
EOSINOPHIL # BLD AUTO: 330 CELLS/UL (ref 15–500)
EOSINOPHIL NFR BLD AUTO: 4.4 %
ERYTHROCYTE [DISTWIDTH] IN BLOOD BY AUTOMATED COUNT: 14.1 % (ref 11–15)
GLUCOSE SERPL-MCNC: 180 MG/DL (ref 65–99)
GLUCOSE UR QL STRIP: ABNORMAL
HBA1C MFR BLD: 7.3 %
HCT VFR BLD AUTO: 45.2 % (ref 35–45)
HDLC SERPL-MCNC: 73 MG/DL
HGB BLD-MCNC: 14.6 G/DL (ref 11.7–15.5)
HGB UR QL STRIP: NEGATIVE
HYALINE CASTS #/AREA URNS LPF: ABNORMAL /LPF
KETONES UR QL STRIP: NEGATIVE
LDLC SERPL CALC-MCNC: 104 MG/DL (CALC)
LEUKOCYTE ESTERASE UR QL STRIP: ABNORMAL
LYMPHOCYTES # BLD AUTO: 2483 CELLS/UL (ref 850–3900)
LYMPHOCYTES NFR BLD AUTO: 33.1 %
MCH RBC QN AUTO: 29.9 PG (ref 27–33)
MCHC RBC AUTO-ENTMCNC: 32.3 G/DL (ref 32–36)
MCV RBC AUTO: 92.6 FL (ref 80–100)
MONOCYTES # BLD AUTO: 563 CELLS/UL (ref 200–950)
MONOCYTES NFR BLD AUTO: 7.5 %
NEUTROPHILS # BLD AUTO: 4043 CELLS/UL (ref 1500–7800)
NEUTROPHILS NFR BLD AUTO: 53.9 %
NITRITE UR QL STRIP: NEGATIVE
NONHDLC SERPL-MCNC: 122 MG/DL (CALC)
PH UR STRIP: 6.5 [PH] (ref 5–8)
PLATELET # BLD AUTO: 326 THOUSAND/UL (ref 140–400)
PMV BLD REES-ECKER: 10.8 FL (ref 7.5–12.5)
POTASSIUM SERPL-SCNC: 4.8 MMOL/L (ref 3.5–5.3)
PROT SERPL-MCNC: 6.8 G/DL (ref 6.1–8.1)
PROT UR QL STRIP: NEGATIVE
RBC # BLD AUTO: 4.88 MILLION/UL (ref 3.8–5.1)
RBC #/AREA URNS HPF: ABNORMAL /HPF
SERVICE CMNT-IMP: ABNORMAL
SODIUM SERPL-SCNC: 141 MMOL/L (ref 135–146)
SP GR UR STRIP: 1.02 (ref 1–1.03)
SQUAMOUS #/AREA URNS HPF: ABNORMAL /HPF
TRIGL SERPL-MCNC: 88 MG/DL
TSH SERPL-ACNC: 0.64 MIU/L (ref 0.4–4.5)
WBC # BLD AUTO: 7.5 THOUSAND/UL (ref 3.8–10.8)
WBC #/AREA URNS HPF: ABNORMAL /HPF

## 2025-06-12 ENCOUNTER — APPOINTMENT (OUTPATIENT)
Dept: PRIMARY CARE | Facility: CLINIC | Age: 60
End: 2025-06-12
Payer: COMMERCIAL

## 2025-06-13 ENCOUNTER — APPOINTMENT (OUTPATIENT)
Dept: PRIMARY CARE | Facility: CLINIC | Age: 60
End: 2025-06-13
Payer: COMMERCIAL

## 2025-06-13 VITALS
OXYGEN SATURATION: 95 % | TEMPERATURE: 97 F | DIASTOLIC BLOOD PRESSURE: 83 MMHG | BODY MASS INDEX: 29.18 KG/M2 | WEIGHT: 203.8 LBS | SYSTOLIC BLOOD PRESSURE: 122 MMHG | HEART RATE: 65 BPM | HEIGHT: 70 IN

## 2025-06-13 DIAGNOSIS — K76.0 STEATOSIS OF LIVER: ICD-10-CM

## 2025-06-13 DIAGNOSIS — G47.33 OBSTRUCTIVE SLEEP APNEA (ADULT) (PEDIATRIC): Primary | ICD-10-CM

## 2025-06-13 DIAGNOSIS — F41.9 ANXIETY AND DEPRESSION: ICD-10-CM

## 2025-06-13 DIAGNOSIS — E03.9 ACQUIRED HYPOTHYROIDISM: ICD-10-CM

## 2025-06-13 DIAGNOSIS — T46.6X5A STATIN MYOPATHY: ICD-10-CM

## 2025-06-13 DIAGNOSIS — E55.9 VITAMIN D DEFICIENCY: ICD-10-CM

## 2025-06-13 DIAGNOSIS — R93.1 ABNORMAL SCREENING CARDIAC CT: ICD-10-CM

## 2025-06-13 DIAGNOSIS — F32.A ANXIETY AND DEPRESSION: ICD-10-CM

## 2025-06-13 DIAGNOSIS — E78.2 MIXED HYPERLIPIDEMIA: ICD-10-CM

## 2025-06-13 DIAGNOSIS — Z12.31 ENCOUNTER FOR SCREENING MAMMOGRAM FOR BREAST CANCER: ICD-10-CM

## 2025-06-13 DIAGNOSIS — G47.33 OBSTRUCTIVE SLEEP APNEA (ADULT) (PEDIATRIC): ICD-10-CM

## 2025-06-13 DIAGNOSIS — Z78.0 MENOPAUSE: ICD-10-CM

## 2025-06-13 DIAGNOSIS — L65.9 ALOPECIA: ICD-10-CM

## 2025-06-13 DIAGNOSIS — M85.9 DISORDER OF BONE DENSITY AND STRUCTURE, UNSPECIFIED: ICD-10-CM

## 2025-06-13 DIAGNOSIS — Z00.00 ROUTINE ADULT HEALTH MAINTENANCE: Primary | ICD-10-CM

## 2025-06-13 DIAGNOSIS — Z13.820 SCREENING FOR OSTEOPOROSIS: ICD-10-CM

## 2025-06-13 DIAGNOSIS — K21.9 GASTROESOPHAGEAL REFLUX DISEASE, UNSPECIFIED WHETHER ESOPHAGITIS PRESENT: ICD-10-CM

## 2025-06-13 DIAGNOSIS — G25.81 RLS (RESTLESS LEGS SYNDROME): ICD-10-CM

## 2025-06-13 DIAGNOSIS — Z87.81 H/O FRACTURE OF WRIST: ICD-10-CM

## 2025-06-13 DIAGNOSIS — Z87.81 H/O COMPRESSION FRACTURE OF SPINE: ICD-10-CM

## 2025-06-13 DIAGNOSIS — M13.0 POLYARTHROPATHY: ICD-10-CM

## 2025-06-13 DIAGNOSIS — G72.0 STATIN MYOPATHY: ICD-10-CM

## 2025-06-13 DIAGNOSIS — J30.1 NON-SEASONAL ALLERGIC RHINITIS DUE TO POLLEN: ICD-10-CM

## 2025-06-13 DIAGNOSIS — Z96.41 INSULIN PUMP STATUS: ICD-10-CM

## 2025-06-13 DIAGNOSIS — E10.3299 TYPE 1 DIABETES MELLITUS WITH MILD NONPROLIFERATIVE RETINOPATHY WITHOUT MACULAR EDEMA, UNSPECIFIED LATERALITY: ICD-10-CM

## 2025-06-13 PROBLEM — R31.21 ASYMPTOMATIC MICROSCOPIC HEMATURIA: Status: RESOLVED | Noted: 2023-07-08 | Resolved: 2025-06-13

## 2025-06-13 PROBLEM — Z79.899 MEDICATION MANAGEMENT: Status: RESOLVED | Noted: 2024-06-11 | Resolved: 2025-06-13

## 2025-06-13 PROBLEM — E83.52 HYPERCALCEMIA: Status: RESOLVED | Noted: 2023-08-07 | Resolved: 2025-06-13

## 2025-06-13 PROBLEM — R19.00 PELVIC MASS: Status: RESOLVED | Noted: 2023-12-06 | Resolved: 2025-06-13

## 2025-06-13 PROBLEM — Z01.419 WELL WOMAN EXAM WITH ROUTINE GYNECOLOGICAL EXAM: Status: RESOLVED | Noted: 2024-08-28 | Resolved: 2025-06-13

## 2025-06-13 PROBLEM — R03.0 BLOOD PRESSURE ELEVATED WITHOUT HISTORY OF HTN: Status: RESOLVED | Noted: 2023-07-08 | Resolved: 2025-06-13

## 2025-06-13 PROCEDURE — 3079F DIAST BP 80-89 MM HG: CPT | Performed by: INTERNAL MEDICINE

## 2025-06-13 PROCEDURE — 90471 IMMUNIZATION ADMIN: CPT | Performed by: INTERNAL MEDICINE

## 2025-06-13 PROCEDURE — 90677 PCV20 VACCINE IM: CPT | Performed by: INTERNAL MEDICINE

## 2025-06-13 PROCEDURE — 3008F BODY MASS INDEX DOCD: CPT | Performed by: INTERNAL MEDICINE

## 2025-06-13 PROCEDURE — 99214 OFFICE O/P EST MOD 30 MIN: CPT | Performed by: INTERNAL MEDICINE

## 2025-06-13 PROCEDURE — 3074F SYST BP LT 130 MM HG: CPT | Performed by: INTERNAL MEDICINE

## 2025-06-13 PROCEDURE — 1036F TOBACCO NON-USER: CPT | Performed by: INTERNAL MEDICINE

## 2025-06-13 PROCEDURE — 99396 PREV VISIT EST AGE 40-64: CPT | Performed by: INTERNAL MEDICINE

## 2025-06-13 RX ORDER — INSULIN ASPART 100 [IU]/ML
INJECTION, SOLUTION INTRAVENOUS; SUBCUTANEOUS
Qty: 90 ML | Refills: 3 | Status: SHIPPED | OUTPATIENT
Start: 2025-06-13

## 2025-06-13 RX ORDER — SPIRONOLACTONE 50 MG/1
50 TABLET, FILM COATED ORAL DAILY
Qty: 90 TABLET | Refills: 3 | Status: SHIPPED | OUTPATIENT
Start: 2025-06-13

## 2025-06-13 RX ORDER — ALBUTEROL SULFATE 90 UG/1
2 INHALANT RESPIRATORY (INHALATION) EVERY 6 HOURS PRN
Qty: 18 G | Refills: 3 | Status: SHIPPED | OUTPATIENT
Start: 2025-06-13

## 2025-06-13 RX ORDER — ESOMEPRAZOLE MAGNESIUM 40 MG/1
40 CAPSULE, DELAYED RELEASE ORAL DAILY
Qty: 90 CAPSULE | Refills: 3 | Status: SHIPPED | OUTPATIENT
Start: 2025-06-13

## 2025-06-13 RX ORDER — LEVOTHYROXINE SODIUM 137 UG/1
137 TABLET ORAL DAILY
Qty: 90 TABLET | Refills: 3 | Status: SHIPPED | OUTPATIENT
Start: 2025-06-13

## 2025-06-13 RX ORDER — MELOXICAM 15 MG/1
15 TABLET ORAL DAILY PRN
Qty: 90 TABLET | Refills: 3 | Status: SHIPPED | OUTPATIENT
Start: 2025-06-13

## 2025-06-13 RX ORDER — EZETIMIBE 10 MG/1
10 TABLET ORAL DAILY
Qty: 90 TABLET | Refills: 1 | Status: SHIPPED | OUTPATIENT
Start: 2025-06-13

## 2025-06-13 RX ORDER — BLOOD SUGAR DIAGNOSTIC
1 STRIP MISCELLANEOUS 4 TIMES DAILY
COMMUNITY

## 2025-06-13 RX ORDER — GABAPENTIN 300 MG/1
CAPSULE ORAL
Qty: 360 CAPSULE | Refills: 3 | Status: SHIPPED | OUTPATIENT
Start: 2025-06-13

## 2025-06-13 RX ORDER — INSULIN ASPART INJECTION 100 [IU]/ML
15 INJECTION, SOLUTION SUBCUTANEOUS
Qty: 4 EACH | Refills: 3 | Status: SHIPPED | OUTPATIENT
Start: 2025-06-13

## 2025-06-13 RX ORDER — GLUCAGON INJECTION, SOLUTION 1 MG/.2ML
INJECTION, SOLUTION SUBCUTANEOUS
Qty: 0.2 ML | Refills: 11 | Status: SHIPPED | OUTPATIENT
Start: 2025-06-13

## 2025-06-13 RX ORDER — BLOOD SUGAR DIAGNOSTIC
1 STRIP MISCELLANEOUS 4 TIMES DAILY
Qty: 360 EACH | Refills: 3 | Status: SHIPPED | OUTPATIENT
Start: 2025-06-13

## 2025-06-13 RX ORDER — FLUOXETINE HYDROCHLORIDE 40 MG/1
40 CAPSULE ORAL DAILY
Qty: 90 CAPSULE | Refills: 3 | Status: SHIPPED | OUTPATIENT
Start: 2025-06-13

## 2025-06-13 RX ORDER — INSULIN GLARGINE 300 [IU]/ML
36 INJECTION, SOLUTION SUBCUTANEOUS EVERY MORNING
Qty: 1.5 ML | Refills: 3 | Status: SHIPPED | OUTPATIENT
Start: 2025-06-13

## 2025-06-13 ASSESSMENT — PROMIS GLOBAL HEALTH SCALE
RATE_MENTAL_HEALTH: POOR
RATE_AVERAGE_FATIGUE: SEVERE
RATE_QUALITY_OF_LIFE: GOOD
EMOTIONAL_PROBLEMS: SOMETIMES
RATE_AVERAGE_PAIN: 5
RATE_SOCIAL_SATISFACTION: FAIR
RATE_GENERAL_HEALTH: GOOD
CARRYOUT_PHYSICAL_ACTIVITIES: MODERATELY
RATE_PHYSICAL_HEALTH: GOOD
CARRYOUT_SOCIAL_ACTIVITIES: FAIR

## 2025-06-13 ASSESSMENT — PATIENT HEALTH QUESTIONNAIRE - PHQ9
4. FEELING TIRED OR HAVING LITTLE ENERGY: SEVERAL DAYS
6. FEELING BAD ABOUT YOURSELF - OR THAT YOU ARE A FAILURE OR HAVE LET YOURSELF OR YOUR FAMILY DOWN: NOT AT ALL
1. LITTLE INTEREST OR PLEASURE IN DOING THINGS: SEVERAL DAYS
9. THOUGHTS THAT YOU WOULD BE BETTER OFF DEAD, OR OF HURTING YOURSELF: NOT AT ALL
2. FEELING DOWN, DEPRESSED OR HOPELESS: NEARLY EVERY DAY
7. TROUBLE CONCENTRATING ON THINGS, SUCH AS READING THE NEWSPAPER OR WATCHING TELEVISION: SEVERAL DAYS
5. POOR APPETITE OR OVEREATING: NOT AT ALL
SUM OF ALL RESPONSES TO PHQ9 QUESTIONS 1 AND 2: 4
3. TROUBLE FALLING OR STAYING ASLEEP OR SLEEPING TOO MUCH: SEVERAL DAYS
SUM OF ALL RESPONSES TO PHQ QUESTIONS 1-9: 7
8. MOVING OR SPEAKING SO SLOWLY THAT OTHER PEOPLE COULD HAVE NOTICED. OR THE OPPOSITE, BEING SO FIGETY OR RESTLESS THAT YOU HAVE BEEN MOVING AROUND A LOT MORE THAN USUAL: NOT AT ALL

## 2025-06-13 NOTE — ASSESSMENT & PLAN NOTE
Annual Wellness exam completed   Preventive Health History reviewed  Ordered:   Labs    Mammogram   BMD   PCV 20  Shingles in future (big day tomorrow so postponing)

## 2025-06-13 NOTE — Clinical Note
Could I use a GLP1/GIP med in her, even though she is type 1 DM--- I am not using it for her diabetes per se, I am using it for weight loss and the other metabolic benefits it would provider her She is on a insulin pump

## 2025-06-13 NOTE — ASSESSMENT & PLAN NOTE
Will get repeat BMD in 1/26  Would like consult with Dr Hines at [facility] rgarding medical therapy, thinking anabolic agent would be better for her at her young age

## 2025-06-13 NOTE — Clinical Note
I would like to use Zepbound for her for weight loss She would obviously not stop her insulin Would this ne OK with you PROGRESS NOTE       Lien Pitts is a 41 year old here for Follow-up   The patient presents for evaluation of anemia.    She has been experiencing intermittent anemia since the birth of her son six years ago, which was also marked by anemia. She is currently 37 weeks pregnant and has received three iron infusions during this pregnancy. Despite these infusions, she continues to take daily iron tablets in addition to her prenatal vitamins. Her hemoglobin level was recorded as 9.8 on 08/06/2024, after receiving three iron infusions a week apart. A subsequent CBC on 09/17/2024 showed an increase to 11.1.    She reports no fluid leakage or blood loss and feels the baby moving normally. She has been experiencing [place] contractions. She reports no presence of blood in her stools. She has had irregular menstrual cycles with heavy clotting, even while using an IUD. She takes a stool softener every other day and consumes a daily smoothie containing banana, spinach, and berries.    She was put on thyroid medications and seemed fine but then went off them when she got pregnant because her blood work came back normal. One month ago, her thyroid level was okay. She has been experiencing dizzy spells and a racing heart. She thinks it is due to the fact that she does not have any more room to breathe because they are inducing her in 2 weeks. She got pregnant because her cycle has been so bad, she passed 2 IUDs since she got her vaccine in 2020.    FAMILY HISTORY  Her mother had uterine cancer.    Review of Systems   Constitutional: Negative.    HENT: Negative.     Respiratory:  Positive for shortness of breath.    Cardiovascular: Negative.          SDOH Former Smoker (Quit 02/24/2023)     Objective   Vitals:    10/03/24 1331   BP: 117/74   Pulse: 88   Resp: 18   Temp: 97.9 °F (36.6 °C)   TempSrc: Oral   SpO2: 99%   Weight: 123.7 kg (272 lb 9.6 oz)   Height: 5' 7.5\" (1.715 m)     Physical Exam  Vitals reviewed.    HENT:      Head: Normocephalic.   Cardiovascular:      Rate and Rhythm: Normal rate and regular rhythm.      Pulses: Normal pulses.      Heart sounds: Normal heart sounds.   Pulmonary:      Effort: Pulmonary effort is normal.      Breath sounds: Normal breath sounds.   Neurological:      Mental Status: She is alert.                  ASSESSMENT AND PLAN        1. Anemia.  Her hemoglobin levels have decreased from 11.8 a year ago to 9.8 currently. This is the first instance of such a drop, and given her pregnancy, it is not unusual for anemia to develop. A complete blood count (CBC) will be conducted today, along with tests for iron, ferritin, B12, and folate levels. She has been advised to consume iron-rich foods and to take her iron supplement with orange juice to enhance absorption. She will continue her current iron supplement and stool softener regimen.    2. Pregnancy at 37 weeks.  She is experiencing symptoms such as shortness of breath, dizziness, and racing heart, which are likely exacerbated by her advanced pregnancy. She is scheduled to be induced in 2 weeks. There are no signs of fluid leakage, bleeding, or abnormal fetal movements. She is advised to stay hydrated and monitor for any changes.    Follow-up  Return in 2 to 3 months for follow-up.    1. Anemia, unspecified type  -     Vitamin B12 And Folate; Future  -     Ferritin; Future  -     Iron And total Iron Binding Capacity; Future  -     CBC with Automated Differential; Future  2. 37 weeks gestation of pregnancy (CMD)

## 2025-06-13 NOTE — PROGRESS NOTES
ANNUAL CPE VISIT  Chief Complaint   Patient presents with    Annual Exam     Nothing to discuss     HPI: Reviewed chart/medical care received since last seen by me.  Feels better overall than she did last year  Retire 2 weeks ago  Saw Rheum  (Copied)  59 y.o. female with history of chronic pain presents for evaluation.She has had an extensive workup by her PCP which include MRI of the lumbar spine x-ray of the pelvic/sacral region, bone nuclear scan and serology workup which essentially had been mostly unremarkable  for underlying inflammatory arthritis.  Patient likely has underlying chronic pain syndrome/fibromyalgia given the chronicity of her pain symptoms without inflammatory arthritis changes.  Review of her records shows she has history of osteopenia with prior history of wrist fracture and stress fractures  Response to steroids did not indicate or confirm underlying inflammatory arthritis   -Given her history of fracture and T-score of -2.1 in the past despite FRAX score not recommending treatment would recommend treatment for this patient she does qualify for diagnosis of osteoporosis with a history of fracture.  Risk and benefit of treatment was discussed with patient patient is agreeable to try alendronate 70 mg weekly.  -Patient given a different NSAID today, start Celebrex 200 mg twice a day she isto hold meloxicam for the trial.  -Obtain additional serologies  -Obtain screening x-rays  -Follow-up in 4 weeks    Celebrex didn't work as well as Mobic so went back to [employer]    Labs:  Component      Latest Ref Rng 6/2/2025   CHOLESTEROL, TOTAL      <200 mg/dL 195    HDL CHOLESTEROL      > OR = 50 mg/dL 73    TRIGLYCERIDES      <150 mg/dL 88    LDL-CHOLESTEROL      mg/dL (calc) 104 (H)    CHOL/HDLC RATIO      <5.0 (calc) 2.7    NON HDL CHOLESTEROL      <130 mg/dL (calc) 122    TSH      0.40 - 4.50 mIU/L 0.64    VITAMIN D,25-OH,TOTAL,IA      30 - 100 ng/mL 72    HEMOGLOBIN A1c      <5.7 % 7.3 (H)        Component      Latest Ref Rn 6/2/2025   WHITE BLOOD CELL COUNT      3.8 - 10.8 Thousand/uL 7.5    RED BLOOD CELL COUNT      3.80 - 5.10 Million/uL 4.88    HEMOGLOBIN      11.7 - 15.5 g/dL 14.6    HEMATOCRIT      35.0 - 45.0 % 45.2 (H)    MCV      80.0 - 100.0 fL 92.6    MCH      27.0 - 33.0 pg 29.9    MCHC      32.0 - 36.0 g/dL 32.3    RDW      11.0 - 15.0 % 14.1    PLATELET COUNT      140 - 400 Thousand/uL 326    MPV      7.5 - 12.5 fL 10.8    ABSOLUTE NEUTROPHILS      1,500 - 7,800 cells/uL 4,043    ABSOLUTE LYMPHOCYTES      850 - 3,900 cells/uL 2,483    ABSOLUTE MONOCYTES      200 - 950 cells/uL 563    ABSOLUTE EOSINOPHILS      15 - 500 cells/uL 330    ABSOLUTE BASOPHILS      0 - 200 cells/uL 83    NEUTROPHILS      % 53.9    LYMPHOCYTES      % 33.1    MONOCYTES      % 7.5    EOSINOPHILS      % 4.4    BASOPHILS      % 1.1    COLOR      YELLOW  YELLOW    APPEARANCE      CLEAR  CLEAR    SPECIFIC GRAVITY      1.001 - 1.035  1.016    PH      5.0 - 8.0  6.5    GLUCOSE      NEGATIVE  TRACE !    BILIRUBIN      NEGATIVE  NEGATIVE    KETONES      NEGATIVE  NEGATIVE    OCCULT BLOOD      NEGATIVE  NEGATIVE    PROTEIN      NEGATIVE  NEGATIVE    NITRITE      NEGATIVE  NEGATIVE    LEUKOCYTE ESTERASE      NEGATIVE  1+ !    WBC      < OR = 5 /HPF 0-5    RBC      < OR = 2 /HPF NONE SEEN    SQUAMOUS EPITHELIAL CELLS      < OR = 5 /HPF 0-5    BACTERIA      NONE SEEN /HPF NONE SEEN    HYALINE CAST      NONE SEEN /LPF NONE SEEN    NOTE --    CULTURE, URINE, ROUTINE SEE NOTE    CULTURE, URINE, ROUTINE --    GLUCOSE      65 - 99 mg/dL 180 (H)    UREA NITROGEN (BUN)      7 - 25 mg/dL 16    CREATININE      0.50 - 1.03 mg/dL 0.84    EGFR      > OR = 60 mL/min/1.73m2 80    SODIUM      135 - 146 mmol/L 141    POTASSIUM      3.5 - 5.3 mmol/L 4.8    CHLORIDE      98 - 110 mmol/L 104    CARBON DIOXIDE      20 - 32 mmol/L 27    ELECTROLYTE BALANCE      7 - 17 mmol/L (calc) 10    CALCIUM      8.6 - 10.4 mg/dL 9.5    PROTEIN, TOTAL      6.1 -  8.1 g/dL 6.8    ALBUMIN      3.6 - 5.1 g/dL 4.1    BILIRUBIN, TOTAL      0.2 - 1.2 mg/dL 0.5    ALKALINE PHOSPHATASE      37 - 153 U/L 98    AST      10 - 35 U/L 21    ALT      6 - 29 U/L 17       Was put on Zetia  Compared to last years labs:  Component      Latest Ref Rng 6/7/2024   CHOLESTEROL      0 - 199 mg/dL 225 (H)    HDL CHOLESTEROL      mg/dL 67.5    Cholesterol/HDL Ratio 3.3    LDL Calculated      <=99 mg/dL 125 (H)    VLDL      0 - 40 mg/dL 33    TRIGLYCERIDES      0 - 149 mg/dL 165 (H)    Non HDL Cholesterol      0 - 149 mg/dL 158 (H)        Assessment and Plan:  Problem List Items Addressed This Visit          High    Routine adult health maintenance - Primary    Overview   Influenza Vaccine, Split-Non Spec11/2/2011, 9/26/2009, 8/18/21, 10/1/22  Moderna COVID 19 vaccine 2/10/21, 3/10/21, 8/18/21  Pneumovax 7/9/2007  Adacel 7/17  PAP/HPV 4/18  ([facility]); PAP 4/5/21 (-), 8/9/23 (-)- sees GYN  Cscope 8/17 (7yrs); 11/2021 (12y)  Mamm 7/20/18, 11/5/19; 4/9/21, 8/9/22, 8/15/23, 8/23/24  BMD 12/19, 12/1/21, 1/4/24  GC/CHlamydia (-) 6/13/24         Current Assessment & Plan   Annual Wellness exam completed   Preventive Health History reviewed  Ordered:   Labs    Mammogram   BMD   PCV 20  Shingles in future (big day tomorrow so postponing)             Relevant Orders    Pneumococcal conjugate vaccine, 20-valent (PREVNAR 20) (Completed)    Comprehensive Metabolic Panel    CBC and Auto Differential    Lipid Panel    Urinalysis with Reflex Microscopic       Medium    Abnormal screening cardiac CT    Overview   8/22:1. Coronary artery calcium score of 13* (LAD) 2. PARK 83rd percentile** for age, gender, and race in asymptomatic patients. *Coronary Artery  Agatston Score risk:Very low 1-99   On Zetia since 2024         Allergic rhinitis    Overview   On Claritin daily  Flonase prn  Failed Singilair         Relevant Medications    albuterol (ProAir HFA) 90 mcg/actuation inhaler    Alopecia    Overview   Managed with  Aldactone         Relevant Medications    spironolactone (Aldactone) 50 mg tablet    Anxiety and depression    Overview   Failed Lexapro and Wellbutrin.   Zoloft caused side effects but helped the most.  Pristiq caused AE (more depressed)  on 40mg Prozac (20mg ineffective)         Relevant Medications    FLUoxetine (PROzac) 40 mg capsule    BMI 29.0-29.9,adult    Overview   NO family hx MTC/MEN  No pers hx pancreatitis         Current Assessment & Plan   Ik ok with her endocrinologsit and Pharm D will start Zepbound (or Wegovy)         Disorder of bone density and structure, unspecified    Overview   Hx wrist fracture , clinical OP  BMD 1/24: Lowest T score -2.2. 10-year   Fracture Risk:   Major Osteoporotic Fracture 15.4%   Hip Fracture 2.3%  S/p Rheum consult in 2024, recommended Fosamax          Current Assessment & Plan   Will get repeat BMD in 1/26  Would like consult with Dr Hines at [facility] rgarding medical therapy, thinking anabolic agent would be better for her at her young age           Relevant Orders    Referral to Rheumatology    Encounter for screening mammogram for breast cancer    Relevant Orders    BI mammo bilateral screening tomosynthesis    Gastroesophageal reflux disease    Overview   On PPI (20mg ineffective)         Relevant Medications    esomeprazole (NexIUM) 40 mg DR capsule    H/O compression fracture of spine    Overview   Comment on above: 1/20 CT chest: .4. Age indeterminate mild compression deformity along the superiorendplate of T8.;         Relevant Orders    Referral to Rheumatology    H/O fracture of wrist    Relevant Orders    Referral to Rheumatology    Hypothyroidism    Overview   On Synthyroid  Goal TSH 1-2  Sees Endo [facility]         Relevant Medications    levothyroxine (Synthroid, Levoxyl) 137 mcg tablet    Insulin pump status    Menopause    Relevant Orders    XR DEXA bone density    Mixed hyperlipidemia    Overview   8/22: Coronary artery calcium score of 13    at the  time  LDL goal <70.  Adverse effect with statins, declined  Started zetia 5/24         Relevant Medications    ezetimibe (Zetia) 10 mg tablet    Other Relevant Orders    TSH with reflex to Free T4 if abnormal    Obstructive sleep apnea (adult) (pediatric)    Overview    2/22 PSG: Moderate PRERNA  CPAP recommend, unable to tolerate         Polyarthropathy    Overview   Saw Rheum at [facility], dx with OA  Saw Rheum at [facility] diagnosis with FM/CPS  Tried Lyrica in past (AE)  On NSAID and Gabapentin  Could consider Cymbalta (though had AE with Pristiq)         Relevant Medications    gabapentin (Neurontin) 300 mg capsule    meloxicam (Mobic) 15 mg tablet    RLS (restless legs syndrome)    Overview   On Gabapentin         Relevant Medications    gabapentin (Neurontin) 300 mg capsule    Screening for osteoporosis    Relevant Orders    XR DEXA bone density    Statin myopathy    Steatosis of liver    Overview   Comment on above: 8/22 US abd: Cyst in the superior right hepatic lobe measures 1.8 cm. There is apossible background of hepatic steatosis. No other focal hepaticabnormality was demonstrated;  FIB-4 Calculation: 0.91 at 6/2/2025  9:56 AM  FIB-4 Result Recommendations:  FIB-4 <1.3 = low risk of hepatic fibrosis; repeat score in 1 year and advise lifestyle interventions of nutrition and exercise             Type 1 diabetes mellitus with mild nonproliferative diabetic retinopathy and without macular edema    Overview   Managed by ENDO at [facility]  On insulin pump  Statin intolerant  On Zetia  7/22 Hba1c 7.1%;         Relevant Medications    glucagon (Gvoke HypoPen 1-Pack) 1 mg/0.2 mL auto-injector    insulin aspart (NovoLOG U-100 Insulin aspart) 100 unit/mL injection    insulin glargine (Toujeo SoloStar U-300 Insulin) 300 unit/mL (1.5 mL) pen    OneTouch Ultra Test    insulin aspart, with niacinamide, (Fiasp FlexTouch U-100 Insulin) 100 unit/mL (3 mL) pen    Other Relevant Orders    Referral to Ophthalmology    Albumin-Creatinine Ratio,  "Urine Random    Hemoglobin A1C    Vitamin D deficiency    Overview   Comment on above: Goal 40-50Off supplement;         Relevant Orders    Vitamin D 25-Hydroxy,Total (for eval of Vitamin D levels)         ROS otherwise negative aside from what was mentioned above in HPI.    Vitals  /83   Pulse 65   Temp 36.1 °C (97 °F)   Ht 1.765 m (5' 9.5\")   Wt 92.4 kg (203 lb 12.8 oz)   LMP  (LMP Unknown)   SpO2 95%   BMI 29.66 kg/m²   Body mass index is 29.66 kg/m².  Physical Exam  Gen: Alert, NAD  HEENT:  Normal exam  Neck:  No masses/nodes palpable; Thyroid nontender and without nodules; No DAYNA  Respiratory:  Lungs CTAB  CV: RRR  Neuro:  Gross motor and sensory intact  Skin:  No suspicious lesions present  Breast: No masses or axillary lymphadenopathy      Allergies and Medications  Crestor [rosuvastatin], Desvenlafaxine, Lyrica [pregabalin], Pollen extracts, and Sertraline  Current Outpatient Medications   Medication Instructions    albuterol (ProAir HFA) 90 mcg/actuation inhaler 2 puffs, inhalation, Every 6 hours PRN    cholecalciferol (Vitamin D-3) 50 MCG (2000 UT) tablet 1 tablet, Daily    esomeprazole (NEXIUM) 40 mg, oral, Daily    ezetimibe (ZETIA) 10 mg, oral, Daily    ferrous sulfate 325 (65 Fe) MG tablet 65 mg of elemental iron, Daily with breakfast    FLUoxetine (PROZAC) 40 mg, oral, Daily    gabapentin (Neurontin) 300 mg capsule TAKE 600 MG IN AM AND TAKE 600 MG AT BEDTIME    glucagon (Gvoke HypoPen 1-Pack) 1 mg/0.2 mL auto-injector Use as directed for severe hypoglycemia    insulin aspart (NovoLOG U-100 Insulin aspart) 100 unit/mL injection  UNITS DAILY VIA INSULIN PUMP    insulin aspart, with niacinamide, (Fiasp FlexTouch U-100 Insulin) 100 unit/mL (3 mL) pen 15 Units, subcutaneous, 4 times daily with meals and nightly, Take as directed per insulin instructions.    insulin glargine (TOUJEO SOLOSTAR U-300 INSULIN) 36 Units, subcutaneous, Every morning, Take as directed per insulin instructions. " "   L. acidophilus/Bifid. animalis 32 billion cell capsule 1 capsule, Daily    levothyroxine (SYNTHROID, LEVOXYL) 137 mcg, oral, Daily    meloxicam (MOBIC) 15 mg, oral, Daily PRN    OneTouch Ultra Test 1 each, subcutaneous, 4 times daily    pen needle, diabetic 32 gauge x 3/16\" needle 1 each, 4 times daily    spironolactone (ALDACTONE) 50 mg, oral, Daily       Active Problem List  Problem List[1]      Comprehensive Medical/Surgical/Social/Family History  Medical History[2]  Surgical History[3]  Social History     Social History Narrative    Social History:    , 2 kids    Retired Teacher    Nonsmoker    Social ETOH    ---    Family History:           M: Macular Degeneration, CAD?/AFIB               F: Prostate CA, Macular Degeneration    B: Type 2 DM    B: Depression    PGM: Lung Cancer                                MGF:Skin  Cancer                             [1]   Patient Active Problem List  Diagnosis    Abnormal screening cardiac CT    Routine adult health maintenance    H/O gastritis    History of colon polyps    H/O fracture of wrist    BMI 29.0-29.9,adult    Allergic rhinitis    Alopecia    Anxiety and depression    Gastroesophageal reflux disease    Hiatal hernia    H/O compression fracture of spine    Cyst, ovary, follicular    Disorder of bone density and structure, unspecified    Steatosis of liver    Liver cyst    Hypothyroidism    Lung nodule    Mixed hyperlipidemia    Obstructive sleep apnea (adult) (pediatric)    Polyarthropathy    Uterine leiomyoma    Vitamin D deficiency    RLS (restless legs syndrome)    AC (acromioclavicular) arthritis    COVID-19 long hauler    Hepatic cyst    Insulin pump status    Type 1 diabetes mellitus with mild nonproliferative diabetic retinopathy and without macular edema    Lumbar disc disease    Lumbar spondylosis    Bone lesion    Statin myopathy    Encounter for screening mammogram for breast cancer    Thoracic spondylosis    Screening for osteoporosis    " Menopause   [2]   Past Medical History:  Diagnosis Date    Abnormal screening cardiac CT     8/22:1. Coronary artery calcium score of 13* (LAD) 2. PARK 83rd percentile** for age, gender, and race in asymptomatic patients. *Coronary Artery  Agatston Score risk:Very low 1-99 Small hiatal hernia. 1.2 cm hepatic cysts and several too small to characterize liver hypodensities. A 1.7 cm round hypodensity at the dome of the liver laterally does not meet the criteria of a simple cyst.    Abnormal x-ray of lumbar spine 12/06/2023 12/23: Degenerative changes as described. Approximate 4 mm osseous density  at the anterior superior aspect of the L5 vertebral body may  represent fracture fragment of indeterminate age. Correlation with CT  may be considered for further assessment as clinically warranted.      1.5 cm ovoid calcific density overlying the pelvis of uncertain  etiology and significance.  12/27/23 CT abd/pelv: 1. No    Depression     Disease of thyroid gland 1996    H/O abdominal ultrasound     8/22: US abd: Cyst in the superior right hepatic lobe measures 1.8 cm. There is a possible background of hepatic steatosis. No other focal hepatic abnormality was demonstrated    H/O bone density study 12/06/2021 12/21:Lowest T score -1.4. FRAX* 10-year Probability of Fracture Based on femoral neck BMD: DualFemur (Left) Major Osteoporotic Fracture: 11.8% Hip Fracture:                1.1% 12/19: -1.7:  FRACTURE RISK (based on FRAX):                       10-year absolute fracture risk:                            - major osteoporotic fracture = 5.8 %                            - hip fracture = 0.5 %    H/O bone density study 01/04/2024    Lowest T score -2.2. 10-year Fracture Risk: Major Osteoporotic Fracture  15.4% Hip Fracture                        2.3%    H/O bone scan 01/18/2024    1. Increased radiotracer uptake on delayed phase only within S1 vertebral body corresponds to a sclerotic lesion seen on prior CT. 2.  "Intense radiotracer uptake in the thoracic spine, likely corresponding to degenerative joint disease at endplates of T7/T8/T9 seen on CT of abdomen and pelvis on 12/27/2023.    H/O bone scan 01/18/2024    1. Increased radiotracer uptake on delayed phase only within S1 vertebral body corresponds to a sclerotic lesion seen on prior CT. 2. Intense radiotracer uptake in the thoracic spine, likely corresponding to degenerative joint disease at endplates of T7/T8/T9 seen on CT of abdomen and pelvis on 12/27/2023.    H/O chest x-ray     3/17:  normal    H/O CT scan 05/06/2024    CT lumbar\" Increased density within the S1 vertebral body corresponds to previously noted signal change on lumbar spine MRI dated 01/22/2024. Consider osseous hemangioma.    Narrowing and degenerative sclerosis with subarticular cyst formation at the L5/S1 interspace most consistent with degenerative change. No definite evidence of discitis or osteomyelitis.    H/O CT scan of abdomen 12/27/2023    1. No acute abdominal or pelvic pathology. 2. Benign hepatic cysts. 3. Enlarged fibroid uterus, The largest projects exophytically off the anterior lower uterine body measuring 4.1 cm. 4. 1.5 cm sclerotic focus S1; bone island versus sclerotic metastatic focus. Bone scan could be performed for further evaluation to see if this area is hypermetabolic as well as to evaluate for any other bony sites.    H/O echocardiogram     11/11: normal    H/O magnetic resonance imaging of lumbar spine 01/23/2024    There is a transitional lumbosacral junction. For counting purposes on this examination it is assumed that the last rib-bearing vertebral body is T12 and there is partial lumbarization of the S1 vertebral body. If intervention is to be performed consider counting from the C2 vertebral body    Within the S1 vertebral body there is a 1.9 cm lesion with mixed signal intensity which is nonspecific.    H/O magnetic resonance imaging of lumbar spine     (cont) " Differential includes an atypical hemangioma, a bone island, but other lesions are not excluded. Consider short-term interval f/u to evaluate for stability At L5-S1 there is a left paracentral/subarticular disc extrusion measuring 1.5 cm with impingement on the descending left S1 nerve root. Mild-to-moderate spinal canal stenosis and mild-to-moderate bilat neural foraminal stenosis also    H/O pelvic ultrasound     : IMPRESSION: Endometrium measures up to 11 mm in thickness, as described above. This is abnormal in the postmenopausal patient with uterine bleeding.  Leiomyomatous uterus, as discussed above.  Simple left ovarian cyst measures up to 2.7 cm; surveillance pelvic ultrasound is recommended in 1 year for this finding.   [3]   Past Surgical History:  Procedure Laterality Date    BELT ABDOMINOPLASTY  07/10/2018    abdominplasty and breast augmentation.    BREAST BIOPSY       SECTION, CLASSIC  07/10/2018     Section     SECTION, LOW TRANSVERSE      COLONOSCOPY  2021: polyp (5 years) 2021: - The examined portion of the ileum was normal.                        - Non-bleeding internal hemorrhoids. 10 y repeat    COLPOSCOPY      ENDOMETRIAL ABLATION      ESOPHAGOGASTRODUODENOSCOPY  2022:  - Medium-sized hiatal hernia. GE junction is at 36cm                        - Gastritis. Biopsied.                        - Normal examined duodenum. : Medium-sized hiatal hernia. GE junction is at 36cm                        - Gastritis. Biopsied.                        - Normal examined duodenum.    POLYSOMNOGRAPHY       PSG: Moderate PRERNA CPAP rec'd    CA BREAST AUGMENTATION WITH IMPLANT      TONSILLECTOMY  07/10/2018    Tonsillectomy With Adenoidectomy    TUBAL LIGATION  07/10/2018    Tubal Ligation

## 2025-06-16 ENCOUNTER — TELEPHONE (OUTPATIENT)
Dept: PRIMARY CARE | Facility: CLINIC | Age: 60
End: 2025-06-16
Payer: COMMERCIAL

## 2025-06-16 NOTE — TELEPHONE ENCOUNTER
Spoke with patient.  Relayed PA was approved for [facility].  Patient verbally understood.

## 2025-06-17 ENCOUNTER — PATIENT MESSAGE (OUTPATIENT)
Dept: ENDOCRINOLOGY | Facility: CLINIC | Age: 60
End: 2025-06-17
Payer: COMMERCIAL

## 2025-06-17 DIAGNOSIS — E10.3299 TYPE 1 DIABETES MELLITUS WITH MILD NONPROLIFERATIVE RETINOPATHY WITHOUT MACULAR EDEMA, UNSPECIFIED LATERALITY: ICD-10-CM

## 2025-06-17 RX ORDER — INSULIN ASPART 100 [IU]/ML
INJECTION, SOLUTION INTRAVENOUS; SUBCUTANEOUS
Qty: 90 ML | Refills: 3 | Status: SHIPPED | OUTPATIENT
Start: 2025-06-17

## 2025-06-17 RX ORDER — INSULIN ASPART INJECTION 100 [IU]/ML
INJECTION, SOLUTION SUBCUTANEOUS
Qty: 50 ML | Refills: 1 | OUTPATIENT
Start: 2025-06-17

## 2025-06-17 NOTE — PATIENT COMMUNICATION
Called and spoke with patient.  Can you please submit PA for Novolog.  This is indicated with her Tandem pump not Fiasp despite being on formulary.  Thanks.

## 2025-06-18 ENCOUNTER — TELEPHONE (OUTPATIENT)
Dept: ENDOCRINOLOGY | Facility: CLINIC | Age: 60
End: 2025-06-18
Payer: COMMERCIAL

## 2025-06-19 DIAGNOSIS — E10.3299 TYPE 1 DIABETES MELLITUS WITH MILD NONPROLIFERATIVE RETINOPATHY WITHOUT MACULAR EDEMA, UNSPECIFIED LATERALITY: ICD-10-CM

## 2025-06-19 RX ORDER — INSULIN ASPART 100 [IU]/ML
INJECTION, SOLUTION INTRAVENOUS; SUBCUTANEOUS
Qty: 90 ML | Refills: 3 | Status: CANCELLED | OUTPATIENT
Start: 2025-06-19

## 2025-07-03 ENCOUNTER — PATIENT MESSAGE (OUTPATIENT)
Dept: PRIMARY CARE | Facility: CLINIC | Age: 60
End: 2025-07-03
Payer: COMMERCIAL

## 2025-07-03 VITALS — BODY MASS INDEX: 28.49 KG/M2 | WEIGHT: 199 LBS | HEIGHT: 70 IN

## 2025-07-03 DIAGNOSIS — G47.33 OBSTRUCTIVE SLEEP APNEA (ADULT) (PEDIATRIC): ICD-10-CM

## 2025-07-03 NOTE — PATIENT COMMUNICATION
Patient calling for refill on their weight loss medication   Formatted and sent NP    Height: 5'10  Current weight: 199 lbs  Previous weight: 203lbs  Current med/dose: 2.5 mg/0.5 mL  Last weight loss appt: 06/13/2025  On current dose at least 1 month prior to requesting dose increase: yes, increase to 5 mg  Tolerating without AE/SE? : just a little nausea but resolves  Pharmacy: [facility]    Updated flowsheet  Updated med list   Formatted refill request

## 2025-07-31 DIAGNOSIS — G47.33 OBSTRUCTIVE SLEEP APNEA (ADULT) (PEDIATRIC): ICD-10-CM

## 2025-07-31 NOTE — TELEPHONE ENCOUNTER
Patient calling for refill on their weight loss medication     Current weight: 196 lb  Current med/dose: 5mg  On current dose at least 1 month? yes  Would you like to increase or stay on the same dose? increase  Any side effects? no  Pharmacy: [facility]     Updated med list   Formatted refill request

## 2025-08-13 ENCOUNTER — TELEPHONE (OUTPATIENT)
Dept: ENDOCRINOLOGY | Facility: CLINIC | Age: 60
End: 2025-08-13
Payer: COMMERCIAL

## 2025-08-14 ENCOUNTER — APPOINTMENT (OUTPATIENT)
Dept: ENDOCRINOLOGY | Facility: CLINIC | Age: 60
End: 2025-08-14
Payer: COMMERCIAL

## 2025-08-14 VITALS — WEIGHT: 194 LBS | BODY MASS INDEX: 27.84 KG/M2

## 2025-08-14 DIAGNOSIS — E10.3299 TYPE 1 DIABETES MELLITUS WITH MILD NONPROLIFERATIVE RETINOPATHY WITHOUT MACULAR EDEMA, UNSPECIFIED LATERALITY: ICD-10-CM

## 2025-08-14 DIAGNOSIS — E03.9 HYPOTHYROIDISM, UNSPECIFIED TYPE: ICD-10-CM

## 2025-08-14 DIAGNOSIS — E66.3 OVERWEIGHT WITH BODY MASS INDEX (BMI) OF 27 TO 27.9 IN ADULT: ICD-10-CM

## 2025-08-14 DIAGNOSIS — E78.2 MIXED HYPERLIPIDEMIA: ICD-10-CM

## 2025-08-14 DIAGNOSIS — E03.9 ACQUIRED HYPOTHYROIDISM: ICD-10-CM

## 2025-08-14 DIAGNOSIS — Z46.81 INSULIN PUMP TITRATION: ICD-10-CM

## 2025-08-14 DIAGNOSIS — Z97.8 USES SELF-APPLIED CONTINUOUS GLUCOSE MONITORING DEVICE: ICD-10-CM

## 2025-08-14 DIAGNOSIS — E10.3299 TYPE 1 DIABETES MELLITUS WITH MILD NONPROLIFERATIVE RETINOPATHY WITHOUT MACULAR EDEMA, UNSPECIFIED LATERALITY: Primary | ICD-10-CM

## 2025-08-14 PROCEDURE — 1036F TOBACCO NON-USER: CPT | Performed by: NURSE PRACTITIONER

## 2025-08-14 PROCEDURE — 95251 CONT GLUC MNTR ANALYSIS I&R: CPT | Performed by: NURSE PRACTITIONER

## 2025-08-14 PROCEDURE — 99214 OFFICE O/P EST MOD 30 MIN: CPT | Performed by: NURSE PRACTITIONER

## 2025-08-14 RX ORDER — BLOOD-GLUCOSE SENSOR
EACH MISCELLANEOUS
Qty: 1 EACH | Refills: 11 | Status: SHIPPED | OUTPATIENT
Start: 2025-08-14 | End: 2025-08-14

## 2025-08-14 RX ORDER — BLOOD-GLUCOSE SENSOR
EACH MISCELLANEOUS
Qty: 3 EACH | Refills: 11 | Status: SHIPPED | OUTPATIENT
Start: 2025-08-14

## 2025-08-14 RX ORDER — LEVOTHYROXINE SODIUM 137 UG/1
137 TABLET ORAL DAILY
Qty: 90 TABLET | Refills: 3 | Status: SHIPPED | OUTPATIENT
Start: 2025-08-14

## 2025-08-14 ASSESSMENT — ENCOUNTER SYMPTOMS
OCCASIONAL FEELINGS OF UNSTEADINESS: 1
DIZZINESS: 0
DEPRESSION: 1
ACTIVITY CHANGE: 0
POLYPHAGIA: 0
SEIZURES: 0
SLEEP DISTURBANCE: 0
FREQUENCY: 0
NUMBNESS: 0
POLYDIPSIA: 0
WEAKNESS: 0
NAUSEA: 0
CONSTIPATION: 0
PALPITATIONS: 0
DIARRHEA: 0
APPETITE CHANGE: 0
FATIGUE: 0
SHORTNESS OF BREATH: 0
LOSS OF SENSATION IN FEET: 0
NERVOUS/ANXIOUS: 0

## 2025-08-14 ASSESSMENT — PATIENT HEALTH QUESTIONNAIRE - PHQ9: 1. LITTLE INTEREST OR PLEASURE IN DOING THINGS: SEVERAL DAYS

## 2025-08-21 ENCOUNTER — TELEPHONE (OUTPATIENT)
Dept: PRIMARY CARE | Facility: CLINIC | Age: 60
End: 2025-08-21
Payer: COMMERCIAL

## 2025-08-21 PROBLEM — Z71.89 CARDIAC RISK COUNSELING: Status: ACTIVE | Noted: 2025-08-21

## 2025-08-22 ENCOUNTER — HOSPITAL ENCOUNTER (OUTPATIENT)
Dept: RADIOLOGY | Facility: CLINIC | Age: 60
Discharge: HOME | End: 2025-08-22
Payer: COMMERCIAL

## 2025-08-22 DIAGNOSIS — Z12.31 ENCOUNTER FOR SCREENING MAMMOGRAM FOR BREAST CANCER: ICD-10-CM

## 2025-08-22 PROCEDURE — 77063 BREAST TOMOSYNTHESIS BI: CPT

## 2025-08-28 ENCOUNTER — PATIENT MESSAGE (OUTPATIENT)
Dept: PRIMARY CARE | Facility: CLINIC | Age: 60
End: 2025-08-28
Payer: COMMERCIAL

## 2025-08-28 VITALS — BODY MASS INDEX: 27.69 KG/M2 | WEIGHT: 193 LBS

## 2025-08-28 DIAGNOSIS — G47.33 OBSTRUCTIVE SLEEP APNEA (ADULT) (PEDIATRIC): ICD-10-CM

## 2025-08-29 ENCOUNTER — PATIENT MESSAGE (OUTPATIENT)
Dept: PRIMARY CARE | Facility: CLINIC | Age: 60
End: 2025-08-29
Payer: COMMERCIAL

## 2025-08-29 DIAGNOSIS — G47.33 OBSTRUCTIVE SLEEP APNEA (ADULT) (PEDIATRIC): ICD-10-CM

## 2025-08-29 RX ORDER — SEMAGLUTIDE 0.25 MG/.5ML
0.25 INJECTION, SOLUTION SUBCUTANEOUS WEEKLY
Qty: 2 ML | Refills: 0 | Status: SHIPPED | OUTPATIENT
Start: 2025-08-29 | End: 2025-08-29

## 2026-02-16 ENCOUNTER — APPOINTMENT (OUTPATIENT)
Dept: ENDOCRINOLOGY | Facility: CLINIC | Age: 61
End: 2026-02-16
Payer: COMMERCIAL

## 2026-07-06 ENCOUNTER — APPOINTMENT (OUTPATIENT)
Dept: PRIMARY CARE | Facility: CLINIC | Age: 61
End: 2026-07-06
Payer: COMMERCIAL